# Patient Record
Sex: FEMALE | Race: WHITE | Employment: PART TIME | URBAN - METROPOLITAN AREA
[De-identification: names, ages, dates, MRNs, and addresses within clinical notes are randomized per-mention and may not be internally consistent; named-entity substitution may affect disease eponyms.]

---

## 2017-02-10 ENCOUNTER — TRANSFERRED RECORDS (OUTPATIENT)
Dept: HEALTH INFORMATION MANAGEMENT | Facility: CLINIC | Age: 32
End: 2017-02-10

## 2017-02-10 LAB
HPV ABSTRACT: NORMAL
PAP SMEAR - HIM PATIENT REPORTED: NEGATIVE

## 2017-03-15 ENCOUNTER — TRANSFERRED RECORDS (OUTPATIENT)
Dept: HEALTH INFORMATION MANAGEMENT | Facility: CLINIC | Age: 32
End: 2017-03-15

## 2017-04-04 ENCOUNTER — TRANSFERRED RECORDS (OUTPATIENT)
Dept: HEALTH INFORMATION MANAGEMENT | Facility: CLINIC | Age: 32
End: 2017-04-04

## 2017-08-25 ENCOUNTER — EXTERNAL ORDER RESULTS (OUTPATIENT)
Dept: SURGERY | Facility: CLINIC | Age: 32
End: 2017-08-25

## 2017-08-25 ENCOUNTER — TRANSFERRED RECORDS (OUTPATIENT)
Dept: HEALTH INFORMATION MANAGEMENT | Facility: CLINIC | Age: 32
End: 2017-08-25

## 2017-09-11 ENCOUNTER — OFFICE VISIT (OUTPATIENT)
Dept: FAMILY MEDICINE | Facility: CLINIC | Age: 32
End: 2017-09-11
Payer: COMMERCIAL

## 2017-09-11 VITALS
OXYGEN SATURATION: 99 % | TEMPERATURE: 98.4 F | BODY MASS INDEX: 26.99 KG/M2 | HEIGHT: 65 IN | HEART RATE: 87 BPM | SYSTOLIC BLOOD PRESSURE: 121 MMHG | DIASTOLIC BLOOD PRESSURE: 71 MMHG | WEIGHT: 162 LBS

## 2017-09-11 DIAGNOSIS — Z23 NEED FOR PROPHYLACTIC VACCINATION AND INOCULATION AGAINST INFLUENZA: ICD-10-CM

## 2017-09-11 DIAGNOSIS — Z23 NEED FOR TETANUS BOOSTER: ICD-10-CM

## 2017-09-11 DIAGNOSIS — R22.9 LOCALIZED SUPERFICIAL SWELLING, MASS, OR LUMP: Primary | ICD-10-CM

## 2017-09-11 PROCEDURE — 90686 IIV4 VACC NO PRSV 0.5 ML IM: CPT | Performed by: PHYSICIAN ASSISTANT

## 2017-09-11 PROCEDURE — 90715 TDAP VACCINE 7 YRS/> IM: CPT | Performed by: PHYSICIAN ASSISTANT

## 2017-09-11 PROCEDURE — 90471 IMMUNIZATION ADMIN: CPT | Performed by: PHYSICIAN ASSISTANT

## 2017-09-11 PROCEDURE — 90472 IMMUNIZATION ADMIN EACH ADD: CPT | Performed by: PHYSICIAN ASSISTANT

## 2017-09-11 PROCEDURE — 99203 OFFICE O/P NEW LOW 30 MIN: CPT | Mod: 25 | Performed by: PHYSICIAN ASSISTANT

## 2017-09-11 NOTE — PROGRESS NOTES
Injectable Influenza Immunization Documentation    1.  Are you sick today? (Fever of 100.5 or higher on the day of the clinic)   No    2.  Have you ever had Guillain-Dolores Syndrome within 6 weeks of an influenza vaccionation?  No    3. Do you have a life-threatening allergy to eggs?  No    4. Do you have a life-threatening allergy to a component of the vaccine? May include antibiotics, gelatin or latex.  No     5. Have you ever had a reaction to a dose of flu vaccine that needed immediate medical attention?  No     Form completed by Aydee Marks

## 2017-09-11 NOTE — NURSING NOTE
"Chief Complaint   Patient presents with     Derm Problem       Initial /71 (BP Location: Right arm, Patient Position: Chair, Cuff Size: Adult Regular)  Pulse 87  Temp 98.4  F (36.9  C) (Tympanic)  Ht 5' 5\" (1.651 m)  Wt 162 lb (73.5 kg)  LMP 08/23/2017  SpO2 99%  Breastfeeding? No  BMI 26.96 kg/m2 Estimated body mass index is 26.96 kg/(m^2) as calculated from the following:    Height as of this encounter: 5' 5\" (1.651 m).    Weight as of this encounter: 162 lb (73.5 kg).  Medication Reconciliation: complete   Aydee Marks MA     "

## 2017-09-11 NOTE — PROGRESS NOTES
SUBJECTIVE:   Ivette Rivera is a 31 year old female who presents to clinic today for the following health issues:      Concern - Derm problem   Onset: x 3 weeks     Description:   Bump is on the left lower leg noticed incidentally at first and not sure how. Mild discomfort - with pressure noticed that its squishy - slightly less defined after running. Had an xray done at [facility] to see if anything was growing out of the bone and this was normal.   No pain with running and no limitation.  If doing nothing she seems to notice it more.   No redness or warmth.   No history of trauma.  Saw ortho first as seemed to come on after doing a long run, but it wasn't felt that this was an orthopedic problem.   No other skin lumps.       Intensity: mild    Progression of Symptoms:  same    Accompanying Signs & Symptoms:  Denies growth of the bump - fever- chills - nausea - vomiting -     Previous history of similar problem:   No     Precipitating factors:   Worsened by: Nothing     Alleviating factors:  Improved by: Nothing     Therapies Tried and outcome: Ice - shows no improvement       Problem list and histories reviewed & adjusted, as indicated.  Additional history: as documented    Patient Active Problem List   Diagnosis     Dyspareunia     History reviewed. No pertinent surgical history.    Social History   Substance Use Topics     Smoking status: Never Smoker     Smokeless tobacco: Never Used     Alcohol use Yes      Comment: 2 glass of wine per week      History reviewed. No pertinent family history.      No current outpatient prescriptions on file.     No Known Allergies      Reviewed and updated as needed this visit by clinical staffTobacco  Allergies  Meds  Med Hx  Surg Hx  Fam Hx  Soc Hx      Reviewed and updated as needed this visit by Provider  Tobacco  Allergies  Meds  Med Hx  Surg Hx  Fam Hx  Soc Hx        ROS:  Constitutional, MSK, integumentary systems are negative, except as otherwise  "noted.      OBJECTIVE:   /71 (BP Location: Right arm, Patient Position: Chair, Cuff Size: Adult Regular)  Pulse 87  Temp 98.4  F (36.9  C) (Tympanic)  Ht 5' 5\" (1.651 m)  Wt 162 lb (73.5 kg)  LMP 08/23/2017  SpO2 99%  Breastfeeding? No  BMI 26.96 kg/m2  Body mass index is 26.96 kg/(m^2).  GENERAL: healthy, alert and no distress  SKIN: [id number] superficial cyst-like prominence located along anterior mid shin of L leg. No overlying erythema, warmth or any drainage. No abrasion. No pain or issues with ambulation.     Diagnostic Test Results:  none     ASSESSMENT/PLAN:         ICD-10-CM    1. Localized superficial swelling, mass, or lump R22.9 GENERAL SURG ADULT REFERRAL   2. Need for tetanus booster Z23 TDAP VACCINE (ADACEL)     ADMIN 1st VACCINE   3. Need for prophylactic vaccination and inoculation against influenza Z23 FLU VAC, SPLIT VIRUS IM > 3 YO (QUADRIVALENT) [06257]     Vaccine Administration, Initial [77491]   Watchful waiting had been reviewed with pt previously after her consult with ortho.  Given persistence though she would now like to pursue removal.   Given this is an area of high tension will refer to gen surg.  Risks/benefits of influenza and tetanus vaccines reviewed with pt today as well and she was amenable to updating these.  See Patient Instructions  Patient Instructions   Unclear exact etiology.  Given persistence will refer to general surgery to consult for removal.  Tetanus updated today, let me know if you change your mind about flu vaccine.  Electronically Signed By: Annemarie Mcduffie PA-C          "

## 2017-09-11 NOTE — MR AVS SNAPSHOT
After Visit Summary   9/11/2017    Ivette Rivera    MRN: [id number]           Patient Information     Date Of Birth          1985        Visit Information        Provider Department      9/11/2017 1:40 PM Annemarie Mcduffie PA-C [facility]        Today's Diagnoses     Localized superficial swelling, mass, or lump    -  1    Need for tetanus booster        Encounter for immunization          Care Instructions    Unclear exact etiology.  Given persistence will refer to general surgery to consult for removal.  Tetanus updated today, let me know if you change your mind about flu vaccine.  Electronically Signed By: Annemarie Mcduffie PA-C            Follow-ups after your visit        Additional Services     GENERAL SURG ADULT REFERRAL       Your provider has referred you to: [facility]: [facility] [place] [phone]   [email or web address]    Please be aware that coverage of these services is subject to the terms and limitations of your health insurance plan.  Call member services at your health plan with any benefit or coverage questions.      Please bring the following with you to your appointment:    (1) Any X-Rays, CTs or MRIs which have been performed.  Contact the facility where they were done to arrange for  prior to your scheduled appointment.   (2) List of current medications   (3) This referral request   (4) Any documents/labs given to you for this referral                  Who to contact     If you have questions or need follow up information about today's clinic visit or your schedule please contact [facility] [place] directly at [phone].  Normal or non-critical lab and imaging results will be communicated to you by MyChart, letter or phone within 4 business days after the clinic has received the results. If you do not hear from us within 7 days, please contact the clinic through MyChart or phone.  "If you have a critical or abnormal lab result, we will notify you by phone as soon as possible.  Submit refill requests through NextWidgets or call your pharmacy and they will forward the refill request to us. Please allow 3 business days for your refill to be completed.          Additional Information About Your Visit        Truziphart Information     NextWidgets lets you send messages to your doctor, view your test results, renew your prescriptions, schedule appointments and more. To sign up, go to [email or web address] . Click on \"Log in\" on the left side of the screen, which will take you to the Welcome page. Then click on \"Sign up Now\" on the right side of the page.     You will be asked to enter the access code listed below, as well as some personal information. Please follow the directions to create your username and password.     Your access code is: [email or web address]  Expires: 12/10/2017  2:17 PM     Your access code will  in 90 days. If you need help or a new code, please call your [place] clinic or [phone].        Care EveryWhere ID     This is your Care EveryWhere ID. This could be used by other organizations to access your [facility] medical records  [id number]        Your Vitals Were     Pulse Temperature Height Last Period Pulse Oximetry Breastfeeding?    87 98.4  F (36.9  C) (Tympanic) 5' 5\" (1.651 m) 2017 99% No    BMI (Body Mass Index)                   26.96 kg/m2            Blood Pressure from Last 3 Encounters:   17 121/71    Weight from Last 3 Encounters:   17 162 lb (73.5 kg)              We Performed the Following     ADMIN 1st VACCINE     GENERAL SURG ADULT REFERRAL     TDAP VACCINE (ADACEL)        Primary Care Provider Office Phone # Fax #    Annemarie Mcduffie PA-C [phone] [address]  [place] [address]        Equal Access to Services     PILAR PINEDO AH: Hadii aad ku hadasho Soomaali, waaxda luqadaha, qaybta kaalmada adeegyada, mari scottin " ivonne cornejoadabaljinder cuevastephaniaivana frederick. So [facility] [place] [phone].    ATENCIÓN: Si habla español, tiene a gilliland disposición servicios gratuitos de asistencia lingüística. Ariana al [phone].    We comply with applicable federal civil rights laws and [place] laws. We do not discriminate on the basis of race, color, national origin, age, disability sex, sexual orientation or gender identity.            Thank you!     Thank you for choosing [facility]  for your [facility]. Our goal is always to provide you with excellent care. Hearing back from our patients is one way we can continue to improve our services. Please take a few minutes to complete the written survey that you may receive in the mail after your visit with us. Thank you!             Your Updated Medication List - Protect others around you: Learn how to safely use, store and throw away your medicines at www.disposemymeds.org.      Notice  As of 9/11/2017  2:17 PM    You have not been prescribed any medications.

## 2017-09-16 ENCOUNTER — HEALTH MAINTENANCE LETTER (OUTPATIENT)
Age: 32
End: 2017-09-16

## 2017-09-18 ENCOUNTER — OFFICE VISIT (OUTPATIENT)
Dept: SURGERY | Facility: CLINIC | Age: 32
End: 2017-09-18
Payer: COMMERCIAL

## 2017-09-18 VITALS
SYSTOLIC BLOOD PRESSURE: 102 MMHG | HEIGHT: 65 IN | BODY MASS INDEX: 26.99 KG/M2 | WEIGHT: 162 LBS | DIASTOLIC BLOOD PRESSURE: 60 MMHG

## 2017-09-18 DIAGNOSIS — R22.9 LOCALIZED SUPERFICIAL SWELLING, MASS, OR LUMP: Primary | ICD-10-CM

## 2017-09-18 PROCEDURE — 99203 OFFICE O/P NEW LOW 30 MIN: CPT | Performed by: SURGERY

## 2017-09-18 ASSESSMENT — ENCOUNTER SYMPTOMS
COUGH: 1
TROUBLE SWALLOWING: 1
CLAUDICATION: 1

## 2017-09-18 NOTE — PROGRESS NOTES
HPI      ROS (Review of Systems):      Positive for hoarseness.   HENT: Positive for difficult swallowing.    Respiratory: Positive for cough.    Cardiovascular: Positive for leg pain.          Physical Exam      Assessment:   Ivette Rivera is seen in consultation for left lower leg lump, at the request of Annemarie Mcduffie PA-C.    2.5 cm left lower leg subcutaneous mass.     Plan:  We have discussed the options of observation and excision.  She elects excisional biopsy.  We discussed the indications, alternatives, and risks.  We discussed scarring, numbness, anesthesia, infection, bleeding, and recurrence.    We will schedule surgery with local anesthesia at the patient's convenience.      HPI:  Ivette presents today for a left anterior lower leg subcutaneous mass for the past 4 weeks. She noticed it while shaving her leg.  It seems to be less noticeable after running.  She denies a history of trauma at the site.  She has had no drainage from the site in the past.  She does not have a history of previous infections at this site.  She denies a history of other such masses now or in the past.  She denies a family history of such masses.  It is not painful.  It's size is stable.    Past Medical History:   has no past medical history on file.    Past Surgical History:  Past Surgical History:   Procedure Laterality Date     HERNIA REPAIR          Social History:  Social History     Social History     Marital status:      Spouse name: N/A     Number of children: N/A     Years of education: N/A     Occupational History     Not on file.     Social History Main Topics     Smoking status: Never Smoker     Smokeless tobacco: Never Used     Alcohol use Yes      Comment: 2 glass of wine per week      Drug use: No     Sexual activity: Yes     Other Topics Concern     Not on file     Social History Narrative        Family History:  Family History   Problem Relation Age of Onset     Other Cancer Father       "Coronary Artery Disease Father      DIABETES Maternal Grandmother      Hypertension Maternal Grandmother      Coronary Artery Disease Maternal Grandmother      Hypertension Maternal Grandfather      Colon Cancer Paternal Grandmother      Prostate Cancer Paternal Grandfather      Family history reviewed and not pertinent.    ROS:  The 10 point review of systems is negative other than noted in the HPI and above.    PE:  /60 (BP Location: Right arm, Cuff Size: Adult Regular)  Ht 5' 5\" (1.651 m)  Wt 162 lb (73.5 kg)  LMP 08/23/2017  BMI 26.96 kg/m2  General appearance: well-developed, well-nourished, no apparent distress  HEENT:  Head normocephalic and atraumatic, pupils equal and round, conjunctivae clear, mucous membranes moist, external ears and nose normal.  Normal cervical lymph nodes.  Lungs: respirations unlabored  Musculoskeletal:  Normal station and gait  Extremities: warm, without edema  Neurologic: alert, speech is clear, moves all extremities with good strength  Psychiatric: Mood and affect are appropriate  Skin: There is a firm, mobile 2.5 cm subcutaneous mass on/in the left anterior lower leg.  The overlying skin is normal.  It is non-tender.      IMAGING:  Xray left lower leg.  No abnormality.    This note was created using voice recognition software. Undetected word substitutions or other errors may have occurred.     Time spent with the patient with greater that 50% of the time in discussion was 10 minutes.     Blanca Whitlock MD    Please route or send letter to:  Primary Care Provider (PCP) and Referring Provider      "

## 2017-09-18 NOTE — MR AVS SNAPSHOT
"              After Visit Summary   9/18/2017    Ivette Rivera    MRN: [id number]           Patient Information     Date Of Birth          1985        Visit Information        Provider Department      9/18/2017 1:30 PM Blanca Whitlock MD [facility] General Surgery      Today's Diagnoses     Localized superficial swelling, mass, or lump    -  1       Follow-ups after your visit        Your next 10 appointments already scheduled     Oct 04, 2017  9:00 AM CDT   [facility] [place] Same Day Surgery with Blanca Whitlock MD   [facility] ([facility])    [facility] ([facility])   [phone]              Who to contact     If you have questions or need follow up information about today's clinic visit or your schedule please contact [facility] directly at [phone].  Normal or non-critical lab and imaging results will be communicated to you by [email or web address], letter or phone within 4 business days after the clinic has received the results. If you do not hear from us within 7 days, please contact the clinic through [email or web address] or phone. If you have a critical or abnormal lab result, we will notify you by phone as soon as possible.  Submit refill requests through VISUALPLANT or call your pharmacy and they will forward the refill request to us. Please allow 3 business days for your refill to be completed.          Additional Information About Your Visit        MyChart Information     VISUALPLANT lets you send messages to your doctor, view your test results, renew your prescriptions, schedule appointments and more. To sign up, go to [email or web address] . Click on \"Log in\" on the left side of the screen, which will take you to the Welcome page. Then click on \"Sign up Now\" on the right side of the page.     You will be asked to enter the access code listed below, as " "well as some personal information. Please follow the directions to create your username and password.     Your access code is: [email or web address]  Expires: 12/10/2017  2:17 PM     Your access code will  in 90 days. If you need help or a new code, please call your [facility] clinic or [phone].        Care EveryWhere ID     This is your Care EveryWhere ID. This could be used by other organizations to access your [facility] medical records  [id number]        Your Vitals Were     Height Last Period BMI (Body Mass Index)             5' 5\" (1.651 m) 2017 26.96 kg/m2          Blood Pressure from Last 3 Encounters:   17 102/60   17 121/71    Weight from Last 3 Encounters:   17 162 lb (73.5 kg)   17 162 lb (73.5 kg)              Today, you had the following     No orders found for display       Primary Care Provider Office Phone # Fax #    Annemarie Mcduffie PA-C [phone] [address]  [place] [address]        Equal Access to Services     [facility]: Hadii aad ku hadasho Soomaali, waaxda luqadaha, qaybta kaalmada adeegyada, waxay idiin hayaan adeeg clementearabaljinder lagodfrey . So [facility] [phone].    ATENCIÓN: Si habla español, tiene a gilliland disposición servicios gratuitos de asistencia lingüística. Llame al [phone].    We comply with applicable federal civil rights laws and [place] laws. We do not discriminate on the basis of race, color, national origin, age, disability sex, sexual orientation or gender identity.            Thank you!     Thank you for choosing [facility]  for your care. Our goal is always to provide you with excellent care. Hearing back from our patients is one way we can continue to improve our services. Please take a few minutes to complete the written survey that you may receive in the mail after your visit with us. Thank you!             Your Updated Medication List - Protect others around you: Learn how to safely use, " store and throw away your medicines at www.disposemymeds.org.      Notice  As of 9/18/2017  1:47 PM    You have not been prescribed any medications.

## 2017-09-18 NOTE — LETTER
"      2017     RE:  Ivette Rivera-:  [birth year]     Assessment:   Ivette Rivera is seen in consultation for left lower leg lump, at the request of Annemarie Mcduffie PA-C.     2.5 cm left lower leg subcutaneous mass.      Plan:  We have discussed the options of observation and excision.  She elects excisional biopsy.  We discussed the indications, alternatives, and risks.  We discussed scarring, numbness, anesthesia, infection, bleeding, and recurrence.     We will schedule surgery with local anesthesia at the patient's convenience.        HPI:  Ivette presents today for a left anterior lower leg subcutaneous mass for the past 4 weeks. She noticed it while shaving her leg.  It seems to be less noticeable after running.  She denies a history of trauma at the site.  She has had no drainage from the site in the past.  She does not have a history of previous infections at this site.  She denies a history of other such masses now or in the past.  She denies a family history of such masses.  It is not painful.  It's size is stable.     Past Medical History:  Has no past medical history on file.     Family history reviewed and not pertinent.     ROS:  The 10 point review of systems is negative other than noted in the HPI and above.     PE:  /60 (BP Location: Right arm, Cuff Size: Adult Regular)  Ht 5' 5\" (1.651 m)  Wt 162 lb (73.5 kg)  LMP 2017  BMI 26.96 kg/m2  General appearance: well-developed, well-nourished, no apparent distress  HEENT:  Head normocephalic and atraumatic, pupils equal and round, conjunctivae clear, mucous membranes moist, external ears and nose normal.  Normal cervical lymph nodes.  Lungs: respirations unlabored  Musculoskeletal:  Normal station and gait  Extremities: warm, without edema  Neurologic: alert, speech is clear, moves all extremities with good strength  Psychiatric: Mood and affect are appropriate  Skin: There is a firm, mobile 2.5 cm " subcutaneous mass on/in the left anterior lower leg.  The overlying skin is normal.  It is non-tender.       IMAGING:  Xray left lower leg.  No abnormality.     This note was created using voice recognition software. Undetected word substitutions or other errors may have occurred.         Blanca Whitlock MD

## 2017-09-28 RX ORDER — MULTIVITAMIN,THERAPEUTIC
1 TABLET ORAL DAILY
COMMUNITY

## 2017-09-28 RX ORDER — CETIRIZINE HYDROCHLORIDE 10 MG/1
10 TABLET ORAL DAILY
COMMUNITY

## 2017-10-04 ENCOUNTER — SURGERY (OUTPATIENT)
Age: 32
End: 2017-10-04

## 2017-10-04 ENCOUNTER — HOSPITAL ENCOUNTER (OUTPATIENT)
Facility: CLINIC | Age: 32
Discharge: HOME | End: 2017-10-04
Attending: SURGERY | Admitting: SURGERY
Payer: COMMERCIAL

## 2017-10-04 ENCOUNTER — APPOINTMENT (OUTPATIENT)
Dept: SURGERY | Facility: PHYSICIAN GROUP | Age: 32
End: 2017-10-04
Payer: COMMERCIAL

## 2017-10-04 VITALS
TEMPERATURE: 98.5 F | BODY MASS INDEX: 26.99 KG/M2 | HEART RATE: 63 BPM | RESPIRATION RATE: 16 BRPM | WEIGHT: 162 LBS | SYSTOLIC BLOOD PRESSURE: 107 MMHG | DIASTOLIC BLOOD PRESSURE: 66 MMHG | OXYGEN SATURATION: 98 % | HEIGHT: 65 IN

## 2017-10-04 DIAGNOSIS — R22.9 LOCALIZED SUPERFICIAL SWELLING, MASS, OR LUMP: Primary | ICD-10-CM

## 2017-10-04 PROCEDURE — 40000306 ZZH STATISTIC PRE PROC ASSESS II: Performed by: SURGERY

## 2017-10-04 PROCEDURE — 27618 EXC LEG/ANKLE TUM < 3 CM: CPT | Performed by: SURGERY

## 2017-10-04 PROCEDURE — 27210794 ZZH OR GENERAL SUPPLY STERILE: Performed by: SURGERY

## 2017-10-04 PROCEDURE — S0020 INJECTION, BUPIVICAINE HYDRO: HCPCS | Performed by: SURGERY

## 2017-10-04 PROCEDURE — 36000050 ZZH SURGERY LEVEL 2 1ST 30 MIN: Performed by: SURGERY

## 2017-10-04 PROCEDURE — 88304 TISSUE EXAM BY PATHOLOGIST: CPT | Performed by: SURGERY

## 2017-10-04 PROCEDURE — 25000132 ZZH RX MED GY IP 250 OP 250 PS 637: Performed by: SURGERY

## 2017-10-04 PROCEDURE — 88304 TISSUE EXAM BY PATHOLOGIST: CPT | Mod: 26 | Performed by: SURGERY

## 2017-10-04 PROCEDURE — 71000027 ZZH RECOVERY PHASE 2 EACH 15 MINS: Performed by: SURGERY

## 2017-10-04 PROCEDURE — 25000128 H RX IP 250 OP 636: Performed by: SURGERY

## 2017-10-04 PROCEDURE — 25000125 ZZHC RX 250: Performed by: SURGERY

## 2017-10-04 RX ORDER — SODIUM CHLORIDE, SODIUM LACTATE, POTASSIUM CHLORIDE, CALCIUM CHLORIDE 600; 310; 30; 20 MG/100ML; MG/100ML; MG/100ML; MG/100ML
INJECTION, SOLUTION INTRAVENOUS CONTINUOUS
Status: DISCONTINUED | OUTPATIENT
Start: 2017-10-04 | End: 2017-10-04 | Stop reason: HOSPADM

## 2017-10-04 RX ORDER — ACETAMINOPHEN 325 MG/1
650 TABLET ORAL EVERY 4 HOURS PRN
Qty: 100 TABLET | Refills: 0 | COMMUNITY
Start: 2017-10-04 | End: 2018-12-06

## 2017-10-04 RX ORDER — IBUPROFEN 600 MG/1
600 TABLET, FILM COATED ORAL
Status: COMPLETED | OUTPATIENT
Start: 2017-10-04 | End: 2017-10-04

## 2017-10-04 RX ADMIN — BUPIVACAINE HYDROCHLORIDE 4 ML: 2.5 INJECTION, SOLUTION INFILTRATION; PERINEURAL at 09:06

## 2017-10-04 RX ADMIN — IBUPROFEN 600 MG: 600 TABLET, FILM COATED ORAL at 09:34

## 2017-10-04 NOTE — OP NOTE
General Surgery Operative Note      Pre-operative diagnosis: Left lower leg subcutaneous mass   Post-operative diagnosis: same    Procedure: excision of left lower leg subcutaneous mass (1.5 cm)   Surgeon: Blanca Whitlock MD   Assistant(s): NONE   Anesthesia: Local    Estimated blood loss: 1 cc     Specimens:   ID Type Source Tests Collected by Time Destination   A : left lower leg subcutaneous mass Tissue Leg Lower, Left SURGICAL PATHOLOGY EXAM Blanca Whitlock MD 10/4/2017  9:13 AM           The left lower leg was prepped and draped in standard sterile fashion.  The skin overlying the mass was anesthetized with local anesthetic.  An incision was made with a length of 1.5 cm.  The incision was carried into the subcutaneous tissue and the mass was completely excised from surrounding tissues using a combination of Bovie electrocautery and blunt dissection.  The mass, which was 1.5 cm in diameter and had a pale fibrous-fatty appearance, was then passed off the field as specimen.  Hemostasis was maintained throughout with electrocautery.  The wound was then irrigated with sterile saline and closed in two layers.  The skin was closed with interrupted 3-0 Vicryl subcuticular sutures and Steristrips.  The patient tolerated the procedure well.  Sponge and needle counts were correct at the end of the case.     Blanca Whitlock MD

## 2017-10-04 NOTE — IP AVS SNAPSHOT
[facility] PreOP/PostOP    [address]    [facility] [address]    Phone:  [phone]    Fax:  [phone]                                       After Visit Summary   10/4/2017    Ivette Rivera    MRN: [id number]           After Visit Summary Signature Page     I have received my discharge instructions, and my questions have been answered. I have discussed any challenges I see with this plan with the nurse or doctor.    ..........................................................................................................................................  Patient/Patient Representative Signature      ..........................................................................................................................................  Patient Representative Print Name and Relationship to Patient    ..................................................               ................................................  Date                                            Time    ..........................................................................................................................................  Reviewed by Signature/Title    ...................................................              ..............................................  Date                                                            Time

## 2017-10-04 NOTE — DISCHARGE INSTRUCTIONS
HOME CARE FOLLOWING MINOR SURGERY  KARON Timmons E. Gavin, N. Guttormson, D. Maurer, VANNESA Fontanez    RESULTS:  If a biopsy of tissue was done, you may call for your final pathology report after 1p.m. two working days after surgery.  Your results will also be reviewed with you at your postoperative appointment.    INCISIONAL CARE:    If you have a dressing in place, keep clean and dry for 48 hours; you may replace the gauze if it becomes soiled.    After 48 hours you may remove the dressing and shower.  Do not submerse incision in water for 1 week.    If you have a Dermabond dressing (a type of skin glue), you may shower immediately.    Sutures which are beneath the skin will absorb and do not need to be removed.    Sutures you can see should be removed at your surgeon's office in 10-14 days at the latest.    If present, leave the steri-strips (white paper tapes) in place for 14 days after surgery.    If present, leave Dermabond glue in place until it wears/flakes off.    You may expect a small amount of drainage from your incision.    A lump/ridge under the incision is normal and will gradually resolve.  If it becomes red or very uncomfortable, contact the nurse at your surgeon's office to discuss whether this needs to be evaluated.    ACTIVITY:  Cautiously resume exercise and strenuous activities such as jogging, tennis, aerobics, etc. Also, be careful of stretching activities which affect the area of surgery for two weeks.    DIET:  No restrictions.  Increased fluid intake is recommended. While taking pain medications, increase dietary fiber or add a fiber supplementation like Metamucil or Citrucel to help prevent constipation - a possible side effect of pain medications.    DISCOMFORT:  Local anesthetic placed at surgery should provide relief for 4-8 hours.  Begin taking pain pills before discomfort is severe.  Take the pain medication with some food, when possible, to minimize side  effects.  Intermittent use of ice packs may help during the first 48 hours.  Expect gradual improvement.  You may slowly transition to use of over-the-counter medications for pain relief when you are no longer requiring narcotics for pain control.    RETURN APPOINTMENT:  Schedule a follow-up visit 2-3 weeks post-op.  Office Phone:  [phone]     CONTACT US IF THE FOLLOWING DEVELOPS:   1. A fever that is above 101     2. If there is a large amount of drainage, bleeding, or swelling.   3. Severe pain that is not relieved by your prescription.   4. Drainage that is thick, cloudy, yellow, green or white.   5. Any other questions not answered by  Frequently Asked Questions  sheet.      FREQUENTLY ASKED QUESTIONS:    Q:  How should my incision look?    A:  Normally your incision will appear slightly swollen with light redness directly along the incision itself as it heals.  It may feel like a bump or ridge as the healing/scarring happens, and over time (3-4 months) this bump or ridge feeling should slowly go away.  In general, clear or pink watery drainage can be normal at first as your incision heals, but should decrease over time.    Q:  How do I know if my incision is infected?  A:  Look at your incision for signs of infection, like redness around the incision spreading to surrounding skin, or drainage of cloudy or foul-smelling drainage.  If you feel warm, check your temperature to see if you are running a fever.    **If any of these things occur, please notify the nurse at our office.  We may need you to come into the office for an incision check.      Q:  How do I take care of my incision?  A:  If you have a dressing in place - Starting the day after surgery, replace the dressing 1-2 times a day until there is no further drainage from the incision.  At that time, a dressing is no longer needed.  Try to minimize tape on the skin if irritation is occurring at the tape sites.  If you have significant irritation from  tape on the skin, please call the office to discuss other method of dressing your incision.    Small pieces of tape called  steri-strips  may be present directly overlying your incision; these may be removed 10 days after surgery unless otherwise specified by your surgeon.  If these tapes start to loosen at the ends, you may trim them back until they fall off or are removed.    A:  If you had  Dermabond  tissue glue used as a dressing (this causes your incision to look shiny with a clear covering over it) - This type of dressing wears off with time and does not require more dressings over the top unless it is draining around the glue as it wears off.  Do not apply ointments or lotions over the incisions until the glue has completely worn off.    Q:  There is a piece of tape or a sticky  lead  still on my skin.  Can I remove this?  A:  Sometimes the sticky  leads  used for monitoring during surgery or for evaluation in the emergency department are not all removed while you are in the hospital.  These sometimes have a tab or metal dot on them.  You can easily remove these on your own, like taking off a band-aid.  If there is a gel substance under the  lead , simply wipe/clean it off with a washcloth or paper towel.      Q:  What can I do to minimize constipation (very hard stools, or lack of stools)?  A:  Stay well hydrated.  Increase your dietary fiber intake or take a fiber supplement -with plenty of water.  Walk around frequently.  You may consider an over-the-counter stool-softener.  Your Pharmacist can assist you with choosing one that is stocked at your pharmacy.  Constipation is also one of the most common side effects of pain medication.  If you are using pain medication, be pro-active and try to PREVENT problems with constipation by taking the steps above BEFORE constipation becomes a problem.    Q:  What do I do if I need more pain medications?  A:  Call the office to receive refills.  Be aware that certain  pain meds cannot be called into a pharmacy and actually require a paper prescription.  A change may be made in your pain med as you progress thru your recovery period or if you have side effects to certain meds.    --Pain meds are NOT refilled after 5pm on weekdays, and NOT AT ALL on the weekends, so please look ahead to prevent problems.      Q:  Why am I having a hard time sleeping now that I am at home?  A:  Many medications you receive while you are in the hospital can impact your sleep for a number of days after your surgery/hospitalization.  Decreased level of activity and naps during the day may also make sleeping at night difficult.  Try to minimize day-time naps, and get up frequently during the day to walk around your home during your recovery time.  Sleep aides may be of some help, but are not recommended for long-term use.      Q:  I am having some back discomfort.  What should I do?  A:  This may be related to certain positioning that was required for your surgery, extended periods of time in bed, or other changes in your overall activity level.  You may try ice, heat, acetaminophen, or ibuprofen to treat this temporarily.  Note that many pain medications have acetaminophen in them and would state this on the prescription bottle.  Be sure not to exceed the maximum of 4000mg per day of acetaminophen.     **If the pain you are having does not resolve, is severe, or is a flare of back pain you have had on other occasions prior to surgery, please contact your primary physician for further recommendations or for an appointment to be examined at their office.    Q:  Why am I having headaches?  A:  Headaches can be caused by many things:  caffeine withdrawal, use of pain meds, dehydration, high blood pressure, lack of sleep, over-activity/exhaustion, flare-up of usual migraine headaches.  If you feel this is related to muscle tension (a band-like feeling around the head, or a pressure at the low-back of the  head) you may try ice or heat to this area.  You may need to drink more fluids (try electrolyte drink like Gatorade), rest, or take your usual migraine medications.   **If your headaches do not resolve, worsen, are accompanied by other symptoms, or if your blood pressure is high, please call your primary physician for recommendation and/or examination.    Q:  I am unable to urinate.  What do I do?  A:  A small percentage of people can have difficulty urinating initially after surgery.  This includes being able to urinate only a very small amount at a time and feeling discomfort or pressure in the very low abdomen.  This is called  urinary retention , and is actually an urgent situation.  Proceed to your nearest Emergency department for evaluation (not an Urgent Care Center).  Sometimes the bladder does not work correctly after certain medications you receive during surgery, or related to certain procedures.  You may need to have a catheter placed until your bladder recovers.  When planning to go to an Emergency department, it may help to call the ER to let them know you are coming in for this problem after a surgery.  This may help you get in quicker to be evaluated.  **If you have symptoms of a urinary tract infection, please contact your primary physician for the proper evaluation and treatment.    If you have other questions, please call the office Monday thru Friday between 8am and 5pm to discuss with the nurse or physician assistant.  #[phone]    There is a surgeon ON CALL on weekday evenings and over the weekend in case of urgent need only, and may be contacted at the same number.    If you are having an emergency, call 911 or proceed to your nearest emergency department.          Maximum acetaminophen (Tylenol) dose from all sources should not exceed 4 grams (4000 mg) per day.    Ibuprofen (Motrin) at 930 am.  Do not take any ibuprofen products until 3:30 pm.

## 2017-10-04 NOTE — IP AVS SNAPSHOT
MRN:[id number]                      After Visit Summary   10/4/2017    Ivette Rivera    MRN: [id number]           Thank you!     Thank you for choosing [facility] for your care. Our goal is always to provide you with excellent care. Hearing back from our patients is one way we can continue to improve our services. Please take a few minutes to complete the written survey that you may receive in the mail after you visit. If you would like to speak to someone directly about your visit please contact Patient Relations at [phone]. Thank you!          Patient Information     Date Of Birth          1985        About your hospital stay     You were admitted on:  October 4, 2017 You last received care in the:  [facility] PreOP/PostOP    You were discharged on:  October 4, 2017       Who to Call     For medical emergencies, please call 911.  For non-urgent questions about your medical care, please call your primary care provider or clinic, [phone]  For questions related to your surgery, please call your surgery clinic        Attending Provider     Provider Specialty    Blanca Whitlock MD Surgery       Primary Care Provider Office Phone # Fax #    Annemarie Mcduffie PA-C [phone]      After Care Instructions     Dressing       Keep dressing clean and dry.  Dressing / incisional care as instructed by RN and or Surgeon                  Further instructions from your care team       HOME CARE FOLLOWING MINOR SURGERY  KARON Timmons E. Gavin, N. Guttormson, D. Maurer, R. O Donnell, L. Thomas    RESULTS:  If a biopsy of tissue was done, you may call for your final pathology report after 1p.m. two working days after surgery.  Your results will also be reviewed with you at your postoperative appointment.    INCISIONAL CARE:    If you have a dressing in place, keep clean and dry for 48 hours; you may replace the gauze if it becomes  soiled.    After 48 hours you may remove the dressing and shower.  Do not submerse incision in water for 1 week.    If you have a Dermabond dressing (a type of skin glue), you may shower immediately.    Sutures which are beneath the skin will absorb and do not need to be removed.    Sutures you can see should be removed at your surgeon's office in 10-14 days at the latest.    If present, leave the steri-strips (white paper tapes) in place for 14 days after surgery.    If present, leave Dermabond glue in place until it wears/flakes off.    You may expect a small amount of drainage from your incision.    A lump/ridge under the incision is normal and will gradually resolve.  If it becomes red or very uncomfortable, contact the nurse at your surgeon's office to discuss whether this needs to be evaluated.    ACTIVITY:  Cautiously resume exercise and strenuous activities such as jogging, tennis, aerobics, etc. Also, be careful of stretching activities which affect the area of surgery for two weeks.    DIET:  No restrictions.  Increased fluid intake is recommended. While taking pain medications, increase dietary fiber or add a fiber supplementation like Metamucil or Citrucel to help prevent constipation - a possible side effect of pain medications.    DISCOMFORT:  Local anesthetic placed at surgery should provide relief for 4-8 hours.  Begin taking pain pills before discomfort is severe.  Take the pain medication with some food, when possible, to minimize side effects.  Intermittent use of ice packs may help during the first 48 hours.  Expect gradual improvement.  You may slowly transition to use of over-the-counter medications for pain relief when you are no longer requiring narcotics for pain control.    RETURN APPOINTMENT:  Schedule a follow-up visit 2-3 weeks post-op.  Office Phone:  [phone]     CONTACT US IF THE FOLLOWING DEVELOPS:   1. A fever that is above 101     2. If there is a large amount of drainage,  bleeding, or swelling.   3. Severe pain that is not relieved by your prescription.   4. Drainage that is thick, cloudy, yellow, green or white.   5. Any other questions not answered by  Frequently Asked Questions  sheet.      FREQUENTLY ASKED QUESTIONS:    Q:  How should my incision look?    A:  Normally your incision will appear slightly swollen with light redness directly along the incision itself as it heals.  It may feel like a bump or ridge as the healing/scarring happens, and over time (3-4 months) this bump or ridge feeling should slowly go away.  In general, clear or pink watery drainage can be normal at first as your incision heals, but should decrease over time.    Q:  How do I know if my incision is infected?  A:  Look at your incision for signs of infection, like redness around the incision spreading to surrounding skin, or drainage of cloudy or foul-smelling drainage.  If you feel warm, check your temperature to see if you are running a fever.    **If any of these things occur, please notify the nurse at our office.  We may need you to come into the office for an incision check.      Q:  How do I take care of my incision?  A:  If you have a dressing in place - Starting the day after surgery, replace the dressing 1-2 times a day until there is no further drainage from the incision.  At that time, a dressing is no longer needed.  Try to minimize tape on the skin if irritation is occurring at the tape sites.  If you have significant irritation from tape on the skin, please call the office to discuss other method of dressing your incision.    Small pieces of tape called  steri-strips  may be present directly overlying your incision; these may be removed 10 days after surgery unless otherwise specified by your surgeon.  If these tapes start to loosen at the ends, you may trim them back until they fall off or are removed.    A:  If you had  Dermabond  tissue glue used as a dressing (this causes your incision  to look shiny with a clear covering over it) - This type of dressing wears off with time and does not require more dressings over the top unless it is draining around the glue as it wears off.  Do not apply ointments or lotions over the incisions until the glue has completely worn off.    Q:  There is a piece of tape or a sticky  lead  still on my skin.  Can I remove this?  A:  Sometimes the sticky  leads  used for monitoring during surgery or for evaluation in the emergency department are not all removed while you are in the hospital.  These sometimes have a tab or metal dot on them.  You can easily remove these on your own, like taking off a band-aid.  If there is a gel substance under the  lead , simply wipe/clean it off with a washcloth or paper towel.      Q:  What can I do to minimize constipation (very hard stools, or lack of stools)?  A:  Stay well hydrated.  Increase your dietary fiber intake or take a fiber supplement -with plenty of water.  Walk around frequently.  You may consider an over-the-counter stool-softener.  Your Pharmacist can assist you with choosing one that is stocked at your pharmacy.  Constipation is also one of the most common side effects of pain medication.  If you are using pain medication, be pro-active and try to PREVENT problems with constipation by taking the steps above BEFORE constipation becomes a problem.    Q:  What do I do if I need more pain medications?  A:  Call the office to receive refills.  Be aware that certain pain meds cannot be called into a pharmacy and actually require a paper prescription.  A change may be made in your pain med as you progress thru your recovery period or if you have side effects to certain meds.    --Pain meds are NOT refilled after 5pm on weekdays, and NOT AT ALL on the weekends, so please look ahead to prevent problems.      Q:  Why am I having a hard time sleeping now that I am at home?  A:  Many medications you receive while you are in the  hospital can impact your sleep for a number of days after your surgery/hospitalization.  Decreased level of activity and naps during the day may also make sleeping at night difficult.  Try to minimize day-time naps, and get up frequently during the day to walk around your home during your recovery time.  Sleep aides may be of some help, but are not recommended for long-term use.      Q:  I am having some back discomfort.  What should I do?  A:  This may be related to certain positioning that was required for your surgery, extended periods of time in bed, or other changes in your overall activity level.  You may try ice, heat, acetaminophen, or ibuprofen to treat this temporarily.  Note that many pain medications have acetaminophen in them and would state this on the prescription bottle.  Be sure not to exceed the maximum of 4000mg per day of acetaminophen.     **If the pain you are having does not resolve, is severe, or is a flare of back pain you have had on other occasions prior to surgery, please contact your primary physician for further recommendations or for an appointment to be examined at their office.    Q:  Why am I having headaches?  A:  Headaches can be caused by many things:  caffeine withdrawal, use of pain meds, dehydration, high blood pressure, lack of sleep, over-activity/exhaustion, flare-up of usual migraine headaches.  If you feel this is related to muscle tension (a band-like feeling around the head, or a pressure at the low-back of the head) you may try ice or heat to this area.  You may need to drink more fluids (try electrolyte drink like Gatorade), rest, or take your usual migraine medications.   **If your headaches do not resolve, worsen, are accompanied by other symptoms, or if your blood pressure is high, please call your primary physician for recommendation and/or examination.    Q:  I am unable to urinate.  What do I do?  A:  A small percentage of people can have difficulty urinating  "initially after surgery.  This includes being able to urinate only a very small amount at a time and feeling discomfort or pressure in the very low abdomen.  This is called  urinary retention , and is actually an urgent situation.  Proceed to your nearest Emergency department for evaluation (not an Urgent Care Center).  Sometimes the bladder does not work correctly after certain medications you receive during surgery, or related to certain procedures.  You may need to have a catheter placed until your bladder recovers.  When planning to go to an Emergency department, it may help to call the ER to let them know you are coming in for this problem after a surgery.  This may help you get in quicker to be evaluated.  **If you have symptoms of a urinary tract infection, please contact your primary physician for the proper evaluation and treatment.    If you have other questions, please call the office Monday thru Friday between 8am and 5pm to discuss with the nurse or physician assistant.  #[phone]    There is a surgeon ON CALL on weekday evenings and over the weekend in case of urgent need only, and may be contacted at the same number.    If you are having an emergency, call 911 or proceed to your nearest emergency department.          Maximum acetaminophen (Tylenol) dose from all sources should not exceed 4 grams (4000 mg) per day.    Ibuprofen (Motrin) at 930 am.  Do not take any ibuprofen products until 3:30 pm.            Pending Results     No orders found from 10/2/2017 to 10/5/2017.            Admission Information     Date & Time Provider Department Dept. Phone    10/4/2017 Blanca Whitlock MD [facility] [place] PreOP/PostOP [phone]      Your Vitals Were     Blood Pressure Pulse Temperature Respirations Height Weight    107/66 63 98.5  F (36.9  C) (Temporal) 16 1.651 m (5' 5\") 73.5 kg (162 lb)    Last Period Pulse Oximetry BMI (Body Mass Index)             09/16/2017 98% 26.96 kg/m2       " "  MyChart Information     [email or web address] lets you send messages to your doctor, view your test results, renew your prescriptions, schedule appointments and more. To sign up, go to [email or web address] . Click on \"Log in\" on the left side of the screen, which will take you to the Welcome page. Then click on \"Sign up Now\" on the right side of the page.     You will be asked to enter the access code listed below, as well as some personal information. Please follow the directions to create your username and password.     Your access code is: [email or web address]  Expires: 12/10/2017  2:17 PM     Your access code will  in 90 days. If you need help or a new code, please call your [facility] clinic or [phone].        Care EveryWhere ID     This is your Care EveryWhere ID. This could be used by other organizations to access your [facility] medical records  [id number]        Equal Access to Services     PILAR PINEDO AH: Sara murrayo Sopatty, waalfredoda barbara, qaybta kaalmada adejason, mari fine . So [facility] [phone].    ATENCIÓN: Si dulce de leon, tiene a gilliland disposición servicios gratuitos de asistencia lingüística. Llame al [phone].    We comply with applicable federal civil rights laws and [place] laws. We do not discriminate on the basis of race, color, national origin, age, disability, sex, sexual orientation, or gender identity.               Review of your medicines      START taking        Dose / Directions    acetaminophen 325 MG tablet   Commonly known as:  TYLENOL   Used for:  Localized superficial swelling, mass, or lump        Dose:  650 mg   Take 2 tablets (650 mg) by mouth every 4 hours as needed for other (mild pain)   Quantity:  100 tablet   Refills:  0         CONTINUE these medicines which have NOT CHANGED        Dose / Directions    cetirizine 10 MG tablet   Commonly known as:  zyrTEC        Dose:  10 mg   Take 10 mg by mouth daily   Refills:  0       IBUPROFEN PO "        Dose:  400 mg   Take 400 mg by mouth every 4 hours as needed for moderate pain   Refills:  0       multivitamin, therapeutic Tabs tablet        Dose:  1 tablet   Take 1 tablet by mouth daily   Refills:  0            Where to get your medicines      Some of these will need a paper prescription and others can be bought over the counter. Ask your nurse if you have questions.     You don't need a prescription for these medications     acetaminophen 325 MG tablet                Protect others around you: Learn how to safely use, store and throw away your medicines at www.disposemymeds.org.             Medication List: This is a list of all your medications and when to take them. Check marks below indicate your daily home schedule. Keep this list as a reference.      Medications           Morning Afternoon Evening Bedtime As Needed    acetaminophen 325 MG tablet   Commonly known as:  TYLENOL   Take 2 tablets (650 mg) by mouth every 4 hours as needed for other (mild pain)                                cetirizine 10 MG tablet   Commonly known as:  zyrTEC   Take 10 mg by mouth daily                                IBUPROFEN PO   Take 400 mg by mouth every 4 hours as needed for moderate pain                                multivitamin, therapeutic Tabs tablet   Take 1 tablet by mouth daily

## 2017-10-05 LAB — COPATH REPORT: NORMAL

## 2017-10-05 NOTE — PROGRESS NOTES
Please inform the patient of this benign result.  The changes are consistent with trauma/injury, a bruise of the fatty tissue under the skin.      EGG

## 2017-10-05 NOTE — PROGRESS NOTES
Patient notified that final pathology results have been reviewed and are benign.  Chana Caraballo RN

## 2017-10-06 ENCOUNTER — TELEPHONE (OUTPATIENT)
Dept: SURGERY | Facility: CLINIC | Age: 32
End: 2017-10-06

## 2017-10-06 NOTE — TELEPHONE ENCOUNTER
Discussed care of incision with patient.  Ok to shower.  No tub baths or submerging incision. Ok to leave ROSEMARY if no drainage.

## 2017-10-06 NOTE — TELEPHONE ENCOUNTER
Name of caller: Patient    Reason for Call:  WANTS TO KNOW IF SHE CAN SHOWER    Surgeon:  Dr. Whitlock    Recent Surgery:  Yes.    If yes, when & what type:  EXCISION LEFT LOWER LEG SUBCUTANEOUS MASS 10/4/17    Best phone number to reach pt at is: [phone]   Ok to leave a message with medical info? Yes.    Pharmacy preferred (if calling for a refill): n/a

## 2017-10-16 ENCOUNTER — TELEPHONE (OUTPATIENT)
Dept: SURGERY | Facility: CLINIC | Age: 32
End: 2017-10-16

## 2017-10-16 NOTE — TELEPHONE ENCOUNTER
Name of caller: Patient    Reason for Call:  Leg incision swells up when she goes running    Surgeon:  Dr. Whitlock    Recent Surgery:  Yes.    If yes, when & what type:  10-4-17 EXCISION LEFT LOWER LEG SUBCUTANEOUS MASS      Best phone number to reach pt at is: [phone]  Ok to leave a message with medical info? Yes.    Pharmacy preferred (if calling for a refill): na

## 2017-10-16 NOTE — TELEPHONE ENCOUNTER
"Patient's swelling after running was described as \"a pingpong ball\" - has now resolved.    Plan:  Patient will decrease running, wear compression garments if on feel for a long length of time.  If swelling returns, or patient has any questions or concerns, will call and schedule an appointment.  Chana Caraballo RN    "

## 2017-10-23 ENCOUNTER — OFFICE VISIT (OUTPATIENT)
Dept: SURGERY | Facility: CLINIC | Age: 32
End: 2017-10-23
Payer: COMMERCIAL

## 2017-10-23 VITALS — HEIGHT: 65 IN | WEIGHT: 162 LBS | BODY MASS INDEX: 26.99 KG/M2

## 2017-10-23 DIAGNOSIS — Z09 SURGICAL FOLLOWUP VISIT: Primary | ICD-10-CM

## 2017-10-23 PROCEDURE — 99024 POSTOP FOLLOW-UP VISIT: CPT | Performed by: PHYSICIAN ASSISTANT

## 2017-10-23 NOTE — MR AVS SNAPSHOT
"              After Visit Summary   10/23/2017    Ivette Rivera    MRN: [id number]           Patient Information     Date Of Birth          1985        Visit Information        Provider Department      10/23/2017 11:00 AM Catalina Rolon PA-C [facility] General Surgery      Today's Diagnoses     Surgical followup visit    -  1       Follow-ups after your visit        Your next 10 appointments already scheduled     Oct 23, 2017 11:00 AM CDT   Post Op with Catalina Rolon PA-C   [facility] ([facility])    [address] [facility] [address]  [facility] [id number] [phone]              Who to contact     If you have questions or need follow up information about today's clinic visit or your schedule please contact [facility] directly at [phone].  Normal or non-critical lab and imaging results will be communicated to you by [employer], letter or phone within 4 business days after the clinic has received the results. If you do not hear from us within 7 days, please contact the clinic through [employer] or phone. If you have a critical or abnormal lab result, we will notify you by phone as soon as possible.  Submit refill requests through [employer] or call your pharmacy and they will forward the refill request to us. Please allow 3 business days for your refill to be completed.          Additional Information About Your Visit        MyChart Information     [employer] lets you send messages to your doctor, view your test results, renew your prescriptions, schedule appointments and more. To sign up, go to [email or web address] . Click on \"Log in\" on the left side of the screen, which will take you to the Welcome page. Then click on \"Sign up Now\" on the right side of the page.     You will be asked to enter the access code listed below, as well as some personal information. Please follow " "the directions to create your username and password.     Your access code is: [email or web address]  Expires: 12/10/2017  2:17 PM     Your access code will  in 90 days. If you need help or a new code, please call your [facility] clinic or [phone].        Care EveryWhere ID     This is your Care EveryWhere ID. This could be used by other organizations to access your [facility] medical records  [id number]        Your Vitals Were     Height Last Period BMI (Body Mass Index)             1.651 m (5' 5\") 2017 26.96 kg/m2          Blood Pressure from Last 3 Encounters:   10/04/17 107/66   17 102/60   17 121/71    Weight from Last 3 Encounters:   10/23/17 73.5 kg (162 lb)   10/04/17 73.5 kg (162 lb)   17 73.5 kg (162 lb)              Today, you had the following     No orders found for display       Primary Care Provider Office Phone # Fax #    Annemarie Mcduffie PA-C [phone] [address]  [place] [address]        Equal Access to Services     DAVID PINEDO : Hadii saritha tinsley hadasho Soomaali, waaxda luqadaha, qaybta kaalmada adeegyada, mari mack. So [facility] [phone].    ATENCIÓN: Si habla español, tiene a gilliland disposición servicios gratuitos de asistencia lingüística. Llame al [phone].    We comply with applicable federal civil rights laws and [place] laws. We do not discriminate on the basis of race, color, national origin, age, disability, sex, sexual orientation, or gender identity.            Thank you!     Thank you for choosing [facility]  for your care. Our goal is always to provide you with excellent care. Hearing back from our patients is one way we can continue to improve our services. Please take a few minutes to complete the written survey that you may receive in the mail after your visit with us. Thank you!             Your Updated Medication List - Protect others around you: Learn how to safely use, " store and throw away your medicines at www.disposemymeds.org.          This list is accurate as of: 10/23/17 10:57 AM.  Always use your most recent med list.                   Brand Name Dispense Instructions for use Diagnosis    acetaminophen 325 MG tablet    TYLENOL    100 tablet    Take 2 tablets (650 mg) by mouth every 4 hours as needed for other (mild pain)    Localized superficial swelling, mass, or lump       cetirizine 10 MG tablet    zyrTEC     Take 10 mg by mouth daily        IBUPROFEN PO      Take 400 mg by mouth every 4 hours as needed for moderate pain        multivitamin, therapeutic Tabs tablet      Take 1 tablet by mouth daily

## 2017-10-23 NOTE — PROGRESS NOTES
[facility] Note   10/23/2017      Subjective:  Ivette Rivera is here for her first postoperative visit. She underwent an excision of left lower leg subcutaneous mass (1.5cm) by Dr. Whitlock on 10/04/2017. Today she  tells me she has been feeling well since surgery although since resuming running and work out activities she has noticed an increase in swelling of the area. She currently does not require narcotic pain medications.     Objective:  Inc - Healing well, well approximated, without signs of infection and no drainage  Small seroma palpated, non tender, without erythema or drainage    Assessment:  S/p excision of left lower leg subcutaneous mass (1.5cm) .   The pathology confirms fibrosis, fat necrosis, hematoma, benign.    Plan:  I offered the patient aspiration of the seroma and she prefers to watch it for the time being as it is asymptomatic.   RTC PRN for seroma aspiration      Catalina Rolon PA-C      Please route or send letter to:  Primary Care Provider (PCP)

## 2018-01-25 ENCOUNTER — TELEPHONE (OUTPATIENT)
Dept: FAMILY MEDICINE | Facility: CLINIC | Age: 33
End: 2018-01-25

## 2018-01-25 NOTE — TELEPHONE ENCOUNTER
"1/25/2018      Patient Communication Preferences indicate  Do not contact  and/or communication by \"Phone\" is not preferred. Call not required per Outreach team.          Outreach ,  Michael King    "

## 2018-08-13 ENCOUNTER — OFFICE VISIT (OUTPATIENT)
Dept: FAMILY MEDICINE | Facility: CLINIC | Age: 33
End: 2018-08-13
Payer: COMMERCIAL

## 2018-08-13 VITALS
OXYGEN SATURATION: 100 % | DIASTOLIC BLOOD PRESSURE: 56 MMHG | HEART RATE: 61 BPM | TEMPERATURE: 98.7 F | BODY MASS INDEX: 27.82 KG/M2 | HEIGHT: 65 IN | WEIGHT: 167 LBS | SYSTOLIC BLOOD PRESSURE: 98 MMHG

## 2018-08-13 DIAGNOSIS — R53.83 FATIGUE, UNSPECIFIED TYPE: Primary | ICD-10-CM

## 2018-08-13 LAB
ALBUMIN SERPL-MCNC: 3.7 G/DL (ref 3.4–5)
ALP SERPL-CCNC: 62 U/L (ref 40–150)
ALT SERPL W P-5'-P-CCNC: 22 U/L (ref 0–50)
ANION GAP SERPL CALCULATED.3IONS-SCNC: 6 MMOL/L (ref 3–14)
AST SERPL W P-5'-P-CCNC: 13 U/L (ref 0–45)
BASOPHILS # BLD AUTO: 0 10E9/L (ref 0–0.2)
BASOPHILS NFR BLD AUTO: 0.3 %
BILIRUB SERPL-MCNC: 0.7 MG/DL (ref 0.2–1.3)
BUN SERPL-MCNC: 14 MG/DL (ref 7–30)
CALCIUM SERPL-MCNC: 8.8 MG/DL (ref 8.5–10.1)
CHLORIDE SERPL-SCNC: 106 MMOL/L (ref 94–109)
CO2 SERPL-SCNC: 28 MMOL/L (ref 20–32)
CREAT SERPL-MCNC: 0.69 MG/DL (ref 0.52–1.04)
DIFFERENTIAL METHOD BLD: NORMAL
EOSINOPHIL # BLD AUTO: 0.2 10E9/L (ref 0–0.7)
EOSINOPHIL NFR BLD AUTO: 2.2 %
ERYTHROCYTE [DISTWIDTH] IN BLOOD BY AUTOMATED COUNT: 12.8 % (ref 10–15)
FERRITIN SERPL-MCNC: 56 NG/ML (ref 12–150)
GFR SERPL CREATININE-BSD FRML MDRD: >90 ML/MIN/1.7M2
GLUCOSE SERPL-MCNC: 86 MG/DL (ref 70–99)
HCT VFR BLD AUTO: 41 % (ref 35–47)
HGB BLD-MCNC: 13.5 G/DL (ref 11.7–15.7)
LYMPHOCYTES # BLD AUTO: 2.5 10E9/L (ref 0.8–5.3)
LYMPHOCYTES NFR BLD AUTO: 32.6 %
MCH RBC QN AUTO: 31 PG (ref 26.5–33)
MCHC RBC AUTO-ENTMCNC: 32.9 G/DL (ref 31.5–36.5)
MCV RBC AUTO: 94 FL (ref 78–100)
MONOCYTES # BLD AUTO: 0.7 10E9/L (ref 0–1.3)
MONOCYTES NFR BLD AUTO: 8.5 %
NEUTROPHILS # BLD AUTO: 4.4 10E9/L (ref 1.6–8.3)
NEUTROPHILS NFR BLD AUTO: 56.4 %
PLATELET # BLD AUTO: 170 10E9/L (ref 150–450)
POTASSIUM SERPL-SCNC: 4.2 MMOL/L (ref 3.4–5.3)
PROT SERPL-MCNC: 7.1 G/DL (ref 6.8–8.8)
RBC # BLD AUTO: 4.35 10E12/L (ref 3.8–5.2)
SODIUM SERPL-SCNC: 140 MMOL/L (ref 133–144)
TSH SERPL DL<=0.005 MIU/L-ACNC: 0.97 MU/L (ref 0.4–4)
WBC # BLD AUTO: 7.7 10E9/L (ref 4–11)

## 2018-08-13 PROCEDURE — 82728 ASSAY OF FERRITIN: CPT | Performed by: FAMILY MEDICINE

## 2018-08-13 PROCEDURE — 36415 COLL VENOUS BLD VENIPUNCTURE: CPT | Performed by: FAMILY MEDICINE

## 2018-08-13 PROCEDURE — 82306 VITAMIN D 25 HYDROXY: CPT | Performed by: FAMILY MEDICINE

## 2018-08-13 PROCEDURE — 85025 COMPLETE CBC W/AUTO DIFF WBC: CPT | Performed by: FAMILY MEDICINE

## 2018-08-13 PROCEDURE — 84443 ASSAY THYROID STIM HORMONE: CPT | Performed by: FAMILY MEDICINE

## 2018-08-13 PROCEDURE — 99213 OFFICE O/P EST LOW 20 MIN: CPT | Performed by: FAMILY MEDICINE

## 2018-08-13 PROCEDURE — 80053 COMPREHEN METABOLIC PANEL: CPT | Performed by: FAMILY MEDICINE

## 2018-08-13 ASSESSMENT — ANXIETY QUESTIONNAIRES
GAD7 TOTAL SCORE: 6
2. NOT BEING ABLE TO STOP OR CONTROL WORRYING: NOT AT ALL
IF YOU CHECKED OFF ANY PROBLEMS ON THIS QUESTIONNAIRE, HOW DIFFICULT HAVE THESE PROBLEMS MADE IT FOR YOU TO DO YOUR WORK, TAKE CARE OF THINGS AT HOME, OR GET ALONG WITH OTHER PEOPLE: SOMEWHAT DIFFICULT
3. WORRYING TOO MUCH ABOUT DIFFERENT THINGS: SEVERAL DAYS
6. BECOMING EASILY ANNOYED OR IRRITABLE: SEVERAL DAYS
7. FEELING AFRAID AS IF SOMETHING AWFUL MIGHT HAPPEN: SEVERAL DAYS
1. FEELING NERVOUS, ANXIOUS, OR ON EDGE: SEVERAL DAYS
5. BEING SO RESTLESS THAT IT IS HARD TO SIT STILL: NOT AT ALL

## 2018-08-13 ASSESSMENT — PATIENT HEALTH QUESTIONNAIRE - PHQ9: 5. POOR APPETITE OR OVEREATING: MORE THAN HALF THE DAYS

## 2018-08-13 NOTE — MR AVS SNAPSHOT
"              After Visit Summary   2018    Ivette Rivera    MRN: [id number]           Patient Information     Date Of Birth          1985        Visit Information        Provider Department      2018 11:20 AM Shine Vargas, DO [facility]        Today's Diagnoses     Fatigue, unspecified type    -  1       Follow-ups after your visit        Follow-up notes from your care team     Return in about 1 month (around 2018) for follow up fatigue, lab results..      Who to contact     If you have questions or need follow up information about today's clinic visit or your schedule please contact [facility] [place] directly at [phone].  Normal or non-critical lab and imaging results will be communicated to you by MyChart, letter or phone within 4 business days after the clinic has received the results. If you do not hear from us within 7 days, please contact the clinic through MyChart or phone. If you have a critical or abnormal lab result, we will notify you by phone as soon as possible.  Submit refill requests through [facility] or call your pharmacy and they will forward the refill request to us. Please allow 3 business days for your refill to be completed.          Additional Information About Your Visit        MyChart Information     [facility] lets you send messages to your doctor, view your test results, renew your prescriptions, schedule appointments and more. To sign up, go to [email or web address] . Click on \"Log in\" on the left side of the screen, which will take you to the Welcome page. Then click on \"Sign up Now\" on the right side of the page.     You will be asked to enter the access code listed below, as well as some personal information. Please follow the directions to create your username and password.     Your access code is: [email or web address]  Expires: 2018 11:57 AM     Your access code will  in 90 days. If you need help or a new code, please call your " "[facility] or [phone].        Care EveryWhere ID     This is your Care EveryWhere ID. This could be used by other organizations to access your [facility] medical records  [id number]        Your Vitals Were     Pulse Temperature Height Last Period Pulse Oximetry BMI (Body Mass Index)    61 98.7  F (37.1  C) (Oral) 5' 5\" (1.651 m) 07/25/2018 100% 27.79 kg/m2       Blood Pressure from Last 3 Encounters:   08/13/18 98/56   10/04/17 107/66   09/18/17 102/60    Weight from Last 3 Encounters:   08/13/18 167 lb (75.8 kg)   10/23/17 162 lb (73.5 kg)   10/04/17 162 lb (73.5 kg)              We Performed the Following     CBC with platelets differential     Comprehensive metabolic panel     Ferritin     TSH with free T4 reflex     Vitamin D Deficiency        Primary Care Provider Office Phone # Fax #    Annemarie Mcduffie PA-C [phone] [address]  [place] [address]        Equal Access to Services     [facility]: Hadii aad ku hadasho Soomaali, waaxda luqadaha, qaybta kaalmada adeegyaedwin, mari fine . So [facility] [phone].    ATENCIÓN: Si habla español, tiene a gilliland disposición servicios gratuitos de asistencia lingüística. Ariana al [phone].    We comply with applicable federal civil rights laws and [place] laws. We do not discriminate on the basis of race, color, national origin, age, disability, sex, sexual orientation, or gender identity.            Thank you!     Thank you for choosing [facility]  for your care. Our goal is always to provide you with excellent care. Hearing back from our patients is one way we can continue to improve our services. Please take a few minutes to complete the written survey that you may receive in the mail after your visit with us. Thank you!             Your Updated Medication List - Protect others around you: Learn how to safely use, store and throw away your medicines at www.disposemymeds.org.        "   This list is accurate as of 8/13/18 11:57 AM.  Always use your most recent med list.                   Brand Name Dispense Instructions for use Diagnosis    acetaminophen 325 MG tablet    TYLENOL    100 tablet    Take 2 tablets (650 mg) by mouth every 4 hours as needed for other (mild pain)    Localized superficial swelling, mass, or lump       cetirizine 10 MG tablet    zyrTEC     Take 10 mg by mouth daily        IBUPROFEN PO      Take 400 mg by mouth every 4 hours as needed for moderate pain        multivitamin, therapeutic Tabs tablet      Take 1 tablet by mouth daily

## 2018-08-13 NOTE — LETTER
August 15, 2018    Ivette Rivera  [address] [address] [address]      Dear Ivetet,    Here is a summary of your recent test results:    Liver and gallbladder tests (ALT,AST, Alk phos,bilirubin) are normal.   -Kidney function (GFR) is normal.   -Sodium is normal.   -Potassium is normal.   -Glucose is normal.   -TSH (thyroid stimulating hormone) level is normal which indicates normal thyroid function.   -Normal red blood cell (hgb) levels, normal white blood cell count and normal platelet levels.   -Vitamin D level is normal, 1000 IU daily in diet or supplements is recommended.   -Ferritin (iron) level is normal.     Your test results are enclosed.      Please contact me if you have any questions. [phone]            Thank you very much for trusting [facility] [place].     Healthy regards,  Shine Vargas, DO           Results for orders placed or performed in visit on 08/13/18   Comprehensive metabolic panel   Result Value Ref Range    Sodium 140 133 - 144 mmol/L    Potassium 4.2 3.4 - 5.3 mmol/L    Chloride 106 94 - 109 mmol/L    Carbon Dioxide 28 20 - 32 mmol/L    Anion Gap 6 3 - 14 mmol/L    Glucose 86 70 - 99 mg/dL    Urea Nitrogen 14 7 - 30 mg/dL    Creatinine 0.69 0.52 - 1.04 mg/dL    GFR Estimate >90 >60 mL/min/1.7m2    GFR Estimate If Black >90 >60 mL/min/1.7m2    Calcium 8.8 8.5 - 10.1 mg/dL    Bilirubin Total 0.7 0.2 - 1.3 mg/dL    Albumin 3.7 3.4 - 5.0 g/dL    Protein Total 7.1 6.8 - 8.8 g/dL    Alkaline Phosphatase 62 40 - 150 U/L    ALT 22 0 - 50 U/L    AST 13 0 - 45 U/L   CBC with platelets differential   Result Value Ref Range    WBC 7.7 4.0 - 11.0 10e9/L    RBC Count 4.35 3.8 - 5.2 10e12/L    Hemoglobin 13.5 11.7 - 15.7 g/dL    Hematocrit 41.0 35.0 - 47.0 %    MCV 94 78 - 100 fl    MCH 31.0 26.5 - 33.0 pg    MCHC 32.9 31.5 - 36.5 g/dL    RDW 12.8 10.0 - 15.0 %    Platelet Count 170 150 - 450 10e9/L    Diff Method Automated Method     % Neutrophils  56.4 %    % Lymphocytes 32.6 %    % Monocytes 8.5 %    % Eosinophils 2.2 %    % Basophils 0.3 %    Absolute Neutrophil 4.4 1.6 - 8.3 10e9/L    Absolute Lymphocytes 2.5 0.8 - 5.3 10e9/L    Absolute Monocytes 0.7 0.0 - 1.3 10e9/L    Absolute Eosinophils 0.2 0.0 - 0.7 10e9/L    Absolute Basophils 0.0 0.0 - 0.2 10e9/L   TSH with free T4 reflex   Result Value Ref Range    TSH 0.97 0.40 - 4.00 mU/L   Vitamin D Deficiency   Result Value Ref Range    Vitamin D Deficiency screening 56 20 - 75 ug/L   Ferritin   Result Value Ref Range    Ferritin 56 12 - 150 ng/mL

## 2018-08-13 NOTE — PROGRESS NOTES
SUBJECTIVE:   Ivette Rivera is a 32 year old female who presents to clinic today for the following health issues:      For the past couple years, Ivette has been feeling more tired/fatigued. She states that some days are worse than other but typically by dinner time, she is done and either wants to go to bed or not do anything. She is  and has one daughter. 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 She feels like she sleeps well - about 8-9 hours nightly. Denies any snoring, apparent apneic episodes, orthopnea, PND. She will occasionally wake up during the night but falls right back to sleep.    She is a runner and notes her mileage has increased. However, had an injury to her back in April that caused her to take a few months off.     She denies any fevers, chills, chest pain, palpitations, shortness of breath, or dyspnea. Her appetite is good and thinks she may have gained about 10 lbs in the past year. She does not take any prescribed medications -- takes multivitamin, Zyrtec, and occasionally magnesium.    Ivette does have long term history of GI issues -- primarily bloating and diarrhea. She made changes in her diet (cut out dairy and gluten) and has some improvement. Dates and soy also seem to exacerbate her symptoms. Denies any symptoms of heartburn or upper abdominal pain. Denies any urinary symptoms or blood in stool. She has regular periods that seem moderate to her. She had an ablation done a couple years ago due to irregular menstruation and heavy periods.    Mood- described as happy 50% of the time and calm the other 50%. She states she will have anxiety about everyday type things (ie, are the dishes done, is the bathroom clean, etc...) but is able to push that out of her mind.    PHQ-9 SCORE 8/13/2018   Total Score 8     JOSEPHINE-7 SCORE 8/13/2018   Total Score 6           Problem list and histories reviewed & adjusted, as  "indicated.  Additional history: as documented    Patient Active Problem List   Diagnosis     Dyspareunia     Past Surgical History:   Procedure Laterality Date     EXCISE MASS LOWER EXTREMITY Left 10/4/2017    Procedure: EXCISE MASS LOWER EXTREMITY;  left Lower Leg Subcutaneous Mass Excision ;  Surgeon: Blanca Whitlock MD;  Location: [facility] OR     GYN SURGERY      ablation 2017     HERNIA REPAIR         Social History   Substance Use Topics     Smoking status: Never Smoker     Smokeless tobacco: Never Used     Alcohol use Yes      Comment: 2 glass of wine per week      Family History   Problem Relation Age of Onset     Other Cancer Father      Coronary Artery Disease Father      Diabetes Maternal Grandmother      Hypertension Maternal Grandmother      Coronary Artery Disease Maternal Grandmother      Hypertension Maternal Grandfather      Colon Cancer Paternal Grandmother      Prostate Cancer Paternal Grandfather            Reviewed and updated as needed this visit by clinical staff  Tobacco  Allergies  Meds  Problems       Reviewed and updated as needed this visit by Provider  Allergies  Meds  Problems         ROS:  Constitutional, HEENT, cardiovascular, pulmonary, gi and gu systems are negative, except as otherwise noted.    OBJECTIVE:     BP 98/56 (BP Location: Right arm, Patient Position: Sitting, Cuff Size: Adult Regular)  Pulse 61  Temp 98.7  F (37.1  C) (Oral)  Ht 5' 5\" (1.651 m)  Wt 167 lb (75.8 kg)  LMP 07/25/2018  SpO2 100%  BMI 27.79 kg/m2  Body mass index is 27.79 kg/(m^2).  GENERAL: healthy, alert and no distress  NECK: no adenopathy, no asymmetry, masses, or scars and thyroid normal to palpation  RESP: lungs clear to auscultation - no rales, rhonchi or wheezes  CV: regular rate and rhythm, normal S1 S2, no S3 or S4, no murmur, click or rub, no peripheral edema and peripheral pulses strong  MS: no gross musculoskeletal defects noted, no edema    Diagnostic Test Results:  none "     ASSESSMENT/PLAN:   1. Fatigue, unspecified type: likely multifactorial. Sounds like sleep is not an issue but could consider further evaluation if remainder of work up unrevealing for cause. Will check baseline labs. She does have slightly elevated PHQ-9 and JOSEPHINE-7 scores which could indicate contributing depressive/anxiety symptoms  - Comprehensive metabolic panel  - CBC with platelets differential  - TSH with free T4 reflex  - Vitamin D Deficiency  - Ferritin    Shine Vargas, DO  [facility] [place]

## 2018-08-14 LAB — DEPRECATED CALCIDIOL+CALCIFEROL SERPL-MC: 56 UG/L (ref 20–75)

## 2018-08-14 ASSESSMENT — ANXIETY QUESTIONNAIRES: GAD7 TOTAL SCORE: 6

## 2018-08-14 ASSESSMENT — PATIENT HEALTH QUESTIONNAIRE - PHQ9: SUM OF ALL RESPONSES TO PHQ QUESTIONS 1-9: 8

## 2018-08-21 ENCOUNTER — TELEPHONE (OUTPATIENT)
Dept: FAMILY MEDICINE | Facility: CLINIC | Age: 33
End: 2018-08-21

## 2018-08-21 DIAGNOSIS — R53.83 FATIGUE, UNSPECIFIED TYPE: ICD-10-CM

## 2018-08-21 DIAGNOSIS — R19.7 DIARRHEA, UNSPECIFIED TYPE: Primary | ICD-10-CM

## 2018-08-21 NOTE — TELEPHONE ENCOUNTER
Reason for Call:  Ivette received normal lab results and would like to know what she should do next. Please advise     Best phone number to reach pt at is: [phone]  Ok to leave a detailed message with medical info? yes    Ellie Frazier  Patient Representative

## 2018-08-21 NOTE — TELEPHONE ENCOUNTER
"She saw SW on the 13th and she is still feeling the same. She has felt this way for years.  She was disappointed that labs didn't reveal any issues. We talked about PHQ9 score and ? Need for depression medications. She has never been on meds before and \"doesn't want to just take something to see if it helps\". She said she wants to investigate what is the matter physically first.    I offered appt with SW- f/u note said to return in one month.  She said please ask him if she should see him or refer to specialist. She doesn't have any specialty in mind though.     Josy Jolly RN- Triage [employer]    "

## 2018-08-22 DIAGNOSIS — R19.7 DIARRHEA, UNSPECIFIED TYPE: ICD-10-CM

## 2018-08-22 DIAGNOSIS — R53.83 FATIGUE, UNSPECIFIED TYPE: ICD-10-CM

## 2018-08-22 PROCEDURE — 83516 IMMUNOASSAY NONANTIBODY: CPT | Performed by: FAMILY MEDICINE

## 2018-08-22 PROCEDURE — 83516 IMMUNOASSAY NONANTIBODY: CPT | Mod: 59 | Performed by: FAMILY MEDICINE

## 2018-08-22 PROCEDURE — 36415 COLL VENOUS BLD VENIPUNCTURE: CPT | Performed by: FAMILY MEDICINE

## 2018-08-22 PROCEDURE — 82784 ASSAY IGA/IGD/IGG/IGM EACH: CPT | Performed by: FAMILY MEDICINE

## 2018-08-22 NOTE — TELEPHONE ENCOUNTER
For further work up of her fatigue, I did think of one other test we could check. At her appointment, she noted having bloating and diarrhea with improvement of symptoms when she avoids gluten or dairy. We could check for Celiac disease as malabsorption of nutrients could lead to fatigue. I'll order test and she can come in at her convenience to get blood drawn. We could also have her see an endocrinologist to see if there is any potential for hormonal etiology for her symptoms. I will place a referral as well.    Shine Vargas,   8/22/2018 10:25 AM

## 2018-08-22 NOTE — TELEPHONE ENCOUNTER
Information given to Ivette. Phone number for referral to schedule given and I scheduled her for lab appointment this morning. Radha Russell R.N.

## 2018-08-23 LAB
IGA SERPL-MCNC: 310 MG/DL (ref 70–380)
TTG IGA SER-ACNC: 1 U/ML
TTG IGG SER-ACNC: <1 U/ML

## 2018-10-02 ENCOUNTER — TELEPHONE (OUTPATIENT)
Dept: FAMILY MEDICINE | Facility: CLINIC | Age: 33
End: 2018-10-02

## 2018-10-02 NOTE — TELEPHONE ENCOUNTER
Needs of attention regarding:  -Cervical Cancer Screening    Health Maintenance Topics with due status: Overdue       Topic Date Due    PAP SCREENING Q3 YR (SYSTEM ASSIGNED) 09/19/2004    INFLUENZA VACCINE 09/01/2018       Communication:  See Letter

## 2018-10-02 NOTE — LETTER
[facility]          [address] [place] [address]                                                                                                       [phone]  October 2, 2018    Ivette Rivera  [address] [address] [address]      Dear Ivette,    A review of your record shows that you are due for the following health maintenance exam(s):    -Pap Smear- a screening test done during a pelvic exam to check for abnormal changes in the cells of the cervix. The cervix is the lower part of the uterus that opens into the vagina. Abnormal cells can develop into cancer if not detected and treated. There are no signs or symptoms related to early cervical cancer so a pelvic exam of the female sex organs and a Pap test are needed. Cervical cancer is preventable and curable if abnormal cells are detected and treated early. Pap tests have reduced deaths from cancer of the cervix in the [place] by 70% over the past 50 years.  Please call to arrange this for you or you can also schedule this through [employer] (online medical record access).  Please disregard this reminder if you have already scheduled or have had this exam elsewhere within the last year.  It would be helpful for us to have a copy of your pap smear report in our file so that we can best coordinate your care.                                                                                    In addition, if we see you for your annual health care maintenance exam (complete physical), and it has been over a year since your last exam, then please schedule that as well.    Thank you very much for choosing [facility].   We appreciate the opportunity to serve you and look forward to supporting your healthcare needs in the future.

## 2018-10-17 ENCOUNTER — HOSPITAL ENCOUNTER (EMERGENCY)
Facility: CLINIC | Age: 33
Discharge: HOME | End: 2018-10-17
Attending: PHYSICIAN ASSISTANT | Admitting: PHYSICIAN ASSISTANT
Payer: COMMERCIAL

## 2018-10-17 ENCOUNTER — APPOINTMENT (OUTPATIENT)
Dept: GENERAL RADIOLOGY | Facility: CLINIC | Age: 33
End: 2018-10-17
Attending: PHYSICIAN ASSISTANT
Payer: COMMERCIAL

## 2018-10-17 VITALS
DIASTOLIC BLOOD PRESSURE: 64 MMHG | OXYGEN SATURATION: 100 % | RESPIRATION RATE: 16 BRPM | SYSTOLIC BLOOD PRESSURE: 95 MMHG | TEMPERATURE: 98.8 F

## 2018-10-17 DIAGNOSIS — S20.219A CONTUSION OF CHEST WALL, UNSPECIFIED LATERALITY, INITIAL ENCOUNTER: ICD-10-CM
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
## 2018-10-17 DIAGNOSIS — S16.1XXA STRAIN OF NECK MUSCLE, INITIAL ENCOUNTER: ICD-10-CM

## 2018-10-17 PROCEDURE — 71046 X-RAY EXAM CHEST 2 VIEWS: CPT

## 2018-10-17 PROCEDURE — 25000132 ZZH RX MED GY IP 250 OP 250 PS 637: Performed by: PHYSICIAN ASSISTANT

## 2018-10-17 PROCEDURE — 93005 ELECTROCARDIOGRAM TRACING: CPT

## 2018-10-17 PROCEDURE — 99284 EMERGENCY DEPT VISIT MOD MDM: CPT | Mod: 25

## 2018-10-17 RX ORDER — IBUPROFEN 600 MG/1
600 TABLET, FILM COATED ORAL ONCE
Status: COMPLETED | OUTPATIENT
Start: 2018-10-17 | End: 2018-10-17

## 2018-10-17 RX ORDER — HYDROCODONE BITARTRATE AND ACETAMINOPHEN 5; 325 MG/1; MG/1
1-2 TABLET ORAL EVERY 4 HOURS PRN
Qty: 10 TABLET | Refills: 0 | Status: SHIPPED | OUTPATIENT
Start: 2018-10-17 | End: 2018-12-06

## 2018-10-17 RX ADMIN — IBUPROFEN 600 MG: 600 TABLET, FILM COATED ORAL at 15:31

## 2018-10-17 ASSESSMENT — ENCOUNTER SYMPTOMS
SHORTNESS OF BREATH: 0
HEADACHES: 1
ABDOMINAL PAIN: 0
NAUSEA: 0
BACK PAIN: 1
NECK PAIN: 1
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
## 2018-10-17 NOTE — DISCHARGE INSTRUCTIONS
Discharge Instructions  Neck Strain    You have been seen today for a neck sprain or strain.  Neck strains usually result from an injury to the neck. Car accidents, contact sports, and falls are common causes of neck strain. Sometimes your neck can start to hurt because of increased activity, muscle tension, an abnormal sleeping position, or because of other problems like arthritis in the neck.     Neck pain usually comes from injured muscles and ligaments. Sometimes there is a herniated ( slipped ) disc. We do not usually do MRI scans to look for these right away, since most herniated discs will get better on their own with time. Today, we did not find any evidence that your neck pain was caused by a serious or dangerous condition. However, sometimes symptoms develop over time and cannot be found during an emergency visit, so it is very important that you follow up with your primary provider.    Generally, every Emergency Department visit should have a follow-up clinic visit with either a primary or a specialty clinic/provider. Please follow-up as instructed by your emergency provider today.    Return to the Emergency Department if:    You have increasing pain in your neck.    You develop difficulty swallowing or breathing.    You have numbness, weakness, or trouble moving your arms or legs.    You have severe dizziness and difficulty walking.    You are unable to control your bladder or bowels.    You develop severe headache or ringing in the ears.    What can I do to help myself at home?    If you had an injury, use cold for the first 1-2 days. Cold helps relieve pain and reduce inflammation.  Apply ice packs to the neck or areas of pain every 1-2 hours for 20 minutes at a time. Place a towel or cloth between your skin and the ice pack.    After the first 2 days, using heat can help with neck pain and stiffness. You may use a warm shower or bath, warm towels on the neck, or a heating pad. Do not sleep with a  heating pad, as you can be burned.     Pain medications - You may take a pain medication such as Tylenol  (acetaminophen), Advil  and Motrin  (ibuprofen), or Aleve  (naproxen).    It is usually best to rest the neck for 1-2 days after an injury, then start gentle stretching exercises.     It is helpful to place a small pillow under the nape of your neck to provide proper neutral positioning.     You should stay active and do your usual work as much as you can, unless this involves heavy physical labor. Ask your provider if you need work restrictions.  If you were given a prescription for medicine here today, be sure to read all of the information (including the package insert) that comes with your prescription.  This will include important information about the medicine, its side effects, and any warnings that you need to know about.  The pharmacist who fills the prescription can provide more information and answer questions you may have about the medicine.  If you have questions or concerns that the pharmacist cannot address, please call or return to the Emergency Department.   Remember that you can always come back to the Emergency Department if you are not able to see your regular provider in the amount of time listed above, if you get any new symptoms, or if there is anything that worries you.
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
## 2018-10-17 NOTE — ED AVS SNAPSHOT
[facility] Emergency Department    [address]    [facility] [address]    Phone:  [phone]    Fax:  [phone]                                       Ivette Rivera   MRN: [id number]    Department:  [facility] Emergency Department   Date of Visit:  10/17/2018           After Visit Summary Signature Page     I have received my discharge instructions, and my questions have been answered. I have discussed any challenges I see with this plan with the nurse or doctor.    ..........................................................................................................................................  Patient/Patient Representative Signature      ..........................................................................................................................................  Patient Representative Print Name and Relationship to Patient    ..................................................               ................................................  Date                                   Time    ..........................................................................................................................................  Reviewed by Signature/Title    ...................................................              ..............................................  Date                                               Time          22EPIC Rev 08/18
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
## 2018-10-18 LAB — INTERPRETATION ECG - MUSE: NORMAL

## 2018-12-06 ENCOUNTER — OFFICE VISIT (OUTPATIENT)
Dept: ENDOCRINOLOGY | Facility: CLINIC | Age: 33
End: 2018-12-06
Payer: COMMERCIAL

## 2018-12-06 VITALS
DIASTOLIC BLOOD PRESSURE: 68 MMHG | SYSTOLIC BLOOD PRESSURE: 104 MMHG | WEIGHT: 167.4 LBS | BODY MASS INDEX: 27.89 KG/M2 | HEIGHT: 65 IN | OXYGEN SATURATION: 100 % | HEART RATE: 76 BPM | TEMPERATURE: 98.1 F

## 2018-12-06 DIAGNOSIS — R53.82 CHRONIC FATIGUE: ICD-10-CM

## 2018-12-06 PROCEDURE — 99204 OFFICE O/P NEW MOD 45 MIN: CPT | Performed by: INTERNAL MEDICINE

## 2018-12-06 NOTE — PATIENT INSTRUCTIONS
[facility]   Dr Rizzo, Endocrinology Department      [facility]   [address]  [place] [address]  Appointment [phone]  Fax: [phone]  [place]: Monday and Tuesday         [place] [address] [facility]  [place] [address]  Appointment [phone]  Fax: [phone]  [place]: Wednesday and Thursday            Need morning labs  Need to make lab only appointment.  Follow up based on that.

## 2018-12-06 NOTE — NURSING NOTE
"ENDOCRINOLOGY INTAKE FORM    Patient Name:  Ivette Rivera  :  1985    Is patient Diabetic?   No  Does patient have non-diabetic or other endocrine issues?  Yes: fatigue    Vitals: /68 (BP Location: Right arm, Patient Position: Chair, Cuff Size: Adult Regular)  Pulse 76  Temp 98.1  F (36.7  C) (Oral)  Ht 1.651 m (5' 5\")  Wt 75.9 kg (167 lb 6.4 oz)  SpO2 100%  Breastfeeding? No  BMI 27.86 kg/m2  BMI= Body mass index is 27.86 kg/(m^2).    Flu vaccine:  Yes:   Pneumonia vaccine:  No    Smoking and Alcohol use:  Social History   Substance Use Topics     Smoking status: Never Smoker     Smokeless tobacco: Never Used     Alcohol use Yes      Comment: 2 glass of wine per week        Mamie Michael CMA    "

## 2018-12-06 NOTE — MR AVS SNAPSHOT
After Visit Summary   2018    Ivette Rivera    MRN: [id number]           Patient Information     Date Of Birth          1985        Visit Information        Provider Department      2018 11:00 AM Lilia Rizzo MD [facility]        Today's Diagnoses     Chronic fatigue          Care Instructions    [facility] & [place] locations   Dr Rizzo, Endocrinology Department      [facility]   [address]  [place] [address]  Appointment [phone]  Fax: [phone]  [place]: Monday and Tuesday         [place] [address] [facility]  [place] [address]  Appointment [phone]  Fax: [phone]  [place]: Wednesday and Thursday            Need morning labs  Need to make lab only appointment.  Follow up based on that.          Follow-ups after your visit        Who to contact     If you have questions or need follow up information about today's clinic visit or your schedule please contact [facility] directly at [phone].  Normal or non-critical lab and imaging results will be communicated to you by MyChart, letter or phone within 4 business days after the clinic has received the results. If you do not hear from us within 7 days, please contact the clinic through MyChart or phone. If you have a critical or abnormal lab result, we will notify you by phone as soon as possible.  Submit refill requests through noodls or call your pharmacy and they will forward the refill request to us. Please allow 3 business days for your refill to be completed.          Additional Information About Your Visit        Care EveryWhere ID     This is your Care EveryWhere ID. This could be used by other organizations to access your [facility] medical records  [id number]        Your Vitals Were     Pulse Temperature Height Pulse Oximetry Breastfeeding?  "BMI (Body Mass Index)    76 98.1  F (36.7  C) (Oral) 1.651 m (5' 5\") 100% No 27.86 kg/m2       Blood Pressure from Last 3 Encounters:   12/06/18 104/68   10/17/18 95/64   08/13/18 98/56    Weight from Last 3 Encounters:   12/06/18 75.9 kg (167 lb 6.4 oz)   08/13/18 75.8 kg (167 lb)   10/23/17 73.5 kg (162 lb)              We Performed the Following     Cortisol     Follicle stimulating hormone     Lutropin     T4 free     TSH     Vitamin B12     Vitamin C ([facility])          Today's Medication Changes          These changes are accurate as of 12/6/18 11:34 AM.  If you have any questions, ask your nurse or doctor.               Stop taking these medicines if you haven't already. Please contact your care team if you have questions.     acetaminophen 325 MG tablet   Commonly known as:  TYLENOL   Stopped by:  Lilia Rizzo MD           HYDROcodone-acetaminophen 5-325 MG tablet   Commonly known as:  NORCO   Stopped by:  Lilia Rizzo MD           IBUPROFEN PO   Stopped by:  Lilia Rizzo MD                    Primary Care Provider Fax #    Physician No Ref-Primary [phone]       No address on file        Equal Access to Services     PILAR PINEDO : Sara murrayo Sopatty, waaxda luqadaha, qaybta kaalmada adeegyada, waxay sj fine . So [facility] [phone].    ATENCIÓN: Si habla español, tiene a gilliland disposición servicios gratuitos de asistencia lingüística. Llame al [phone].    We comply with applicable federal civil rights laws and [place] laws. We do not discriminate on the basis of race, color, national origin, age, disability, sex, sexual orientation, or gender identity.            Thank you!     Thank you for choosing [facility]  for your care. Our goal is always to provide you with excellent care. Hearing back from our patients is one way we can continue to improve our services. Please take a few minutes to complete the written survey " that you may receive in the mail after your visit with us. Thank you!             Your Updated Medication List - Protect others around you: Learn how to safely use, store and throw away your medicines at www.disposemymeds.org.          This list is accurate as of 12/6/18 11:34 AM.  Always use your most recent med list.                   Brand Name Dispense Instructions for use Diagnosis    cetirizine 10 MG tablet    zyrTEC     Take 10 mg by mouth daily        multivitamin, therapeutic Tabs tablet      Take 1 tablet by mouth daily

## 2018-12-06 NOTE — PROGRESS NOTES
Name: Ivette Rivera  Seen at the request of  for chronic fatigue.  Chief Complaint   Patient presents with     Establish Care     referred by Dr. Vargas     Fatigue      HPI:  Ivette Rivera is a 33 year old female who presents for the evaluation of  Chronic fatigue.  Feeling tired all the time X more in last 2.5 years  Morning is OK. Feels better with coffee. By afternoon feels tired. After evening completely drained out and has to go to sleep.  If she sleeps late or has late night event- then feels tired all day next day.  Working as part time .  Sleep is OM most of the time. No snoring.  No sleep study done.  Weight: + 10 gained wt in 1 year   One 9 year old child.  Periods- regular now. 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  + on and off diarrhea.  + cold intolerance.    No joint pain.  No dry mouth or eyes.  Eating healthy. More like paleo.  Exercise- everyday- weights.    She denies any tachycardia, palpitations, heat intolerance, tremors.  Extensive workup was done in past which showed negative screening test for celiac disease, normal ferritin, vitamin D levels, TSH, CBC, CMP.    PMH/PSH:  History reviewed. No pertinent past medical history.  Past Surgical History:   Procedure Laterality Date     EXCISE MASS LOWER EXTREMITY Left 10/4/2017    Procedure: EXCISE MASS LOWER EXTREMITY;  left Lower Leg Subcutaneous Mass Excision ;  Surgeon: Blanca Whitlock MD;  Location: [facility] OR     GYN SURGERY      ablation 2017     HERNIA REPAIR       Family Hx:  Family History   Problem Relation Age of Onset     Other Cancer Father      Coronary Artery Disease Father      Diabetes Maternal Grandmother      Hypertension Maternal Grandmother      Coronary Artery Disease Maternal Grandmother      Hypertension Maternal Grandfather      Colon Cancer Paternal Grandmother      Prostate Cancer Paternal Grandfather      Thyroid disease: No         DM2: No           Social Hx:  Social History     Social History  "    Marital status:      Spouse name: N/A     Number of children: N/A     Years of education: N/A     Occupational History     Not on file.     Social History Main Topics     Smoking status: Never Smoker     Smokeless tobacco: Never Used     Alcohol use Yes      Comment: 2 glass of wine per week      Drug use: No     Sexual activity: Yes     Other Topics Concern     Not on file     Social History Narrative          MEDICATIONS:  has a current medication list which includes the following prescription(s): cetirizine and multivitamin, therapeutic.    ROS     ROS: 10 point ROS neg other than the symptoms noted above in the HPI.    Physical Exam   VS: /68 (BP Location: Right arm, Patient Position: Chair, Cuff Size: Adult Regular)  Pulse 76  Temp 98.1  F (36.7  C) (Oral)  Ht 1.651 m (5' 5\")  Wt 75.9 kg (167 lb 6.4 oz)  SpO2 100%  Breastfeeding? No  BMI 27.86 kg/m2  GENERAL: AXOX3, NAD, well dressed, answering questions appropriately, appears stated age.  HEENT: No exopthalmous, no proptosis, EOMI, no lig lag, no retraction  NECK: Thyroid normal in size, non tender, no nodules were palpated.  CV: RRR  LUNGS: CTAB  ABDOMEN: +BS  NEUROLOGY: CN grossly intact, no tremors  PSYCH: normal affect and mood      LABS:  Last Basic Metabolic Panel:  Lab Results   Component Value Date     08/13/2018      Lab Results   Component Value Date    POTASSIUM 4.2 08/13/2018     Lab Results   Component Value Date    CHLORIDE 106 08/13/2018     Lab Results   Component Value Date    HERRERA 8.8 08/13/2018     Lab Results   Component Value Date    CO2 28 08/13/2018     Lab Results   Component Value Date    BUN 14 08/13/2018     Lab Results   Component Value Date    CR 0.69 08/13/2018     Lab Results   Component Value Date    GLC 86 08/13/2018       TSH   Date Value Ref Range Status   08/13/2018 0.97 0.40 - 4.00 mU/L Final   ]      All pertinent notes, labs, and images personally reviewed by me.     A/P  Ms.Kayla FRANCESCO Rivera " is a 33 year old here for the evaluation of:  Chronic fatigue:  I discussed that it can be multifactorial.  Extensive workup has been done in past as noted above including normal screening test for celiac disease, normal ferritin, vitamin D levels, thyroid function test, CBC and CMP.  She does not have major concerning factor for adrenal insufficiency.  Electrolytes are stable.  Weight has been stable.  Recommend labs as noted below  Can consider sleep study as a next day.  She will talk to primary care provider.    Plan: Cortisol, Vitamin B12, TSH, T4 free, Follicle         stimulating hormone, Lutropin, Vitamin C.             More than 50% of face to face time spent with Ms. Rivera on counseling / coordinating her care.      All questions were answered.  The patient indicates understanding of the above issues and agrees with the plan set forth.       Follow-up:  Based on labs    Lilia Rizzo MD  Endocrinology   [facility]/[place]    CC: Dr.Scott Vargas.    Disclaimer: This note consists of symbols derived from keyboarding, dictation and/or voice recognition software. As a result, there may be errors in the script that have gone undetected. Please consider this when interpreting information found in this chart.    Addendum to above note and clinic visit:    Labs reviewed.    See result note/telephone encounter.

## 2018-12-06 NOTE — LETTER
12/6/2018         RE: Ivette Rivera  [address] [address]        Dear Colleague,    Thank you for referring your patient, Ivette Rivera, to the [facility]. Please see a copy of my visit note below.    Name: Ivette Rivera  Seen at the request of  for chronic fatigue.  Chief Complaint   Patient presents with     Establish Care     referred by Dr. Vargas     Fatigue      HPI:  Ivette Rivera is a 33 year old female who presents for the evaluation of  Chronic fatigue.  Feeling tired all the time X more in last 2.5 years  Morning is OK. Feels better with coffee. By afternoon feels tired. After evening completely drained out and has to go to sleep.  If she sleeps late or has late night event- then feels tired all day next day.  Working as part time .  Sleep is OM most of the time. No snoring.  No sleep study done.  Weight: + 10 gained wt in 1 year   One 9 year old child.  Periods- regular now. 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  + on and off diarrhea.  + cold intolerance.    No joint pain.  No dry mouth or eyes.  Eating healthy. More like paleo.  Exercise- everyday- weights.    She denies any tachycardia, palpitations, heat intolerance, tremors.  Extensive workup was done in past which showed negative screening test for celiac disease, normal ferritin, vitamin D levels, TSH, CBC, CMP.    PMH/PSH:  History reviewed. No pertinent past medical history.  Past Surgical History:   Procedure Laterality Date     EXCISE MASS LOWER EXTREMITY Left 10/4/2017    Procedure: EXCISE MASS LOWER EXTREMITY;  left Lower Leg Subcutaneous Mass Excision ;  Surgeon: Blanca Whitlock MD;  Location: [facility] OR     GYN SURGERY      ablation 2017     HERNIA REPAIR       Family Hx:  Family History   Problem Relation Age of Onset     Other Cancer Father      Coronary Artery Disease Father      Diabetes Maternal Grandmother      Hypertension Maternal Grandmother       "Coronary Artery Disease Maternal Grandmother      Hypertension Maternal Grandfather      Colon Cancer Paternal Grandmother      Prostate Cancer Paternal Grandfather      Thyroid disease: No         DM2: No           Social Hx:  Social History     Social History     Marital status:      Spouse name: N/A     Number of children: N/A     Years of education: N/A     Occupational History     Not on file.     Social History Main Topics     Smoking status: Never Smoker     Smokeless tobacco: Never Used     Alcohol use Yes      Comment: 2 glass of wine per week      Drug use: No     Sexual activity: Yes     Other Topics Concern     Not on file     Social History Narrative          MEDICATIONS:  has a current medication list which includes the following prescription(s): cetirizine and multivitamin, therapeutic.    ROS     ROS: 10 point ROS neg other than the symptoms noted above in the HPI.    Physical Exam   VS: /68 (BP Location: Right arm, Patient Position: Chair, Cuff Size: Adult Regular)  Pulse 76  Temp 98.1  F (36.7  C) (Oral)  Ht 1.651 m (5' 5\")  Wt 75.9 kg (167 lb 6.4 oz)  SpO2 100%  Breastfeeding? No  BMI 27.86 kg/m2  GENERAL: AXOX3, NAD, well dressed, answering questions appropriately, appears stated age.  HEENT: No exopthalmous, no proptosis, EOMI, no lig lag, no retraction  NECK: Thyroid normal in size, non tender, no nodules were palpated.  CV: RRR  LUNGS: CTAB  ABDOMEN: +BS  NEUROLOGY: CN grossly intact, no tremors  PSYCH: normal affect and mood      LABS:  Last Basic Metabolic Panel:  Lab Results   Component Value Date     08/13/2018      Lab Results   Component Value Date    POTASSIUM 4.2 08/13/2018     Lab Results   Component Value Date    CHLORIDE 106 08/13/2018     Lab Results   Component Value Date    HERRERA 8.8 08/13/2018     Lab Results   Component Value Date    CO2 28 08/13/2018     Lab Results   Component Value Date    BUN 14 08/13/2018     Lab Results   Component Value Date    CR " 0.69 08/13/2018     Lab Results   Component Value Date    GLC 86 08/13/2018       TSH   Date Value Ref Range Status   08/13/2018 0.97 0.40 - 4.00 mU/L Final   ]      All pertinent notes, labs, and images personally reviewed by me.     A/P  Ms.Kayla FRANCESCO Rivera is a 33 year old here for the evaluation of:  Chronic fatigue:  I discussed that it can be multifactorial.  Extensive workup has been done in past as noted above including normal screening test for celiac disease, normal ferritin, vitamin D levels, thyroid function test, CBC and CMP.  She does not have major concerning factor for adrenal insufficiency.  Electrolytes are stable.  Weight has been stable.  Recommend labs as noted below  Can consider sleep study as a next day.  She will talk to primary care provider.    Plan: Cortisol, Vitamin B12, TSH, T4 free, Follicle         stimulating hormone, Lutropin, Vitamin C.             More than 50% of face to face time spent with Ms. Rivera on counseling / coordinating her care.      All questions were answered.  The patient indicates understanding of the above issues and agrees with the plan set forth.       Follow-up:  Based on labs    Lilia Rizzo MD  Endocrinology   [facility]/[place]    CC: Dr.Scott Vargas.    Disclaimer: This note consists of symbols derived from keyboarding, dictation and/or voice recognition software. As a result, there may be errors in the script that have gone undetected. Please consider this when interpreting information found in this chart.    Addendum to above note and clinic visit:    Labs reviewed.    See result note/telephone encounter.            Again, thank you for allowing me to participate in the care of your patient.        Sincerely,        Lilia Rizzo MD

## 2018-12-10 DIAGNOSIS — R53.82 CHRONIC FATIGUE: ICD-10-CM

## 2018-12-10 LAB
CORTIS SERPL-MCNC: 13.7 UG/DL (ref 4–22)
FSH SERPL-ACNC: 6.7 IU/L
LH SERPL-ACNC: 3.3 IU/L
T4 FREE SERPL-MCNC: 1.12 NG/DL (ref 0.76–1.46)
TSH SERPL DL<=0.005 MIU/L-ACNC: 1.09 MU/L (ref 0.4–4)
VIT B12 SERPL-MCNC: 477 PG/ML (ref 193–986)

## 2018-12-10 PROCEDURE — 82180 ASSAY OF ASCORBIC ACID: CPT | Mod: 90 | Performed by: INTERNAL MEDICINE

## 2018-12-10 PROCEDURE — 82607 VITAMIN B-12: CPT | Performed by: INTERNAL MEDICINE

## 2018-12-10 PROCEDURE — 82533 TOTAL CORTISOL: CPT | Performed by: INTERNAL MEDICINE

## 2018-12-10 PROCEDURE — 83001 ASSAY OF GONADOTROPIN (FSH): CPT | Performed by: INTERNAL MEDICINE

## 2018-12-10 PROCEDURE — 83002 ASSAY OF GONADOTROPIN (LH): CPT | Performed by: INTERNAL MEDICINE

## 2018-12-10 PROCEDURE — 36415 COLL VENOUS BLD VENIPUNCTURE: CPT | Performed by: INTERNAL MEDICINE

## 2018-12-10 PROCEDURE — 84443 ASSAY THYROID STIM HORMONE: CPT | Performed by: INTERNAL MEDICINE

## 2018-12-10 PROCEDURE — 99000 SPECIMEN HANDLING OFFICE-LAB: CPT | Performed by: INTERNAL MEDICINE

## 2018-12-10 PROCEDURE — 84439 ASSAY OF FREE THYROXINE: CPT | Performed by: INTERNAL MEDICINE

## 2018-12-12 LAB — VIT C SERPL-MCNC: 81 UMOL/L (ref 23–114)

## 2019-04-24 ENCOUNTER — TRANSFERRED RECORDS (OUTPATIENT)
Dept: HEALTH INFORMATION MANAGEMENT | Facility: CLINIC | Age: 34
End: 2019-04-24

## 2019-04-24 LAB
CHOLEST SERPL-MCNC: 158 MG/DL (ref 100–199)
HDLC SERPL-MCNC: 71 MG/DL
LDLC SERPL CALC-MCNC: 79 MG/DL (ref 0–99)
TRIGL SERPL-MCNC: 41 MG/DL (ref 0–149)
TSH SERPL-ACNC: 1.01 UIU/ML (ref 65–99)
TSH SERPL-ACNC: 1.46 UIU/ML (ref 0.45–4.5)

## 2019-07-30 ENCOUNTER — HOSPITAL PATHOLOGY (OUTPATIENT)
Dept: OTHER | Facility: CLINIC | Age: 34
End: 2019-07-30

## 2019-07-30 ENCOUNTER — TRANSFERRED RECORDS (OUTPATIENT)
Dept: HEALTH INFORMATION MANAGEMENT | Facility: CLINIC | Age: 34
End: 2019-07-30

## 2019-07-31 LAB — COPATH REPORT: NORMAL

## 2019-09-11 ENCOUNTER — TRANSFERRED RECORDS (OUTPATIENT)
Dept: HEALTH INFORMATION MANAGEMENT | Facility: CLINIC | Age: 34
End: 2019-09-11

## 2019-10-14 NOTE — PROGRESS NOTES
SUBJECTIVE:   CC: Ivette Rivera is an 34 year old woman who presents for preventive health visit.       Healthy Habits:    Do you get at least three servings of calcium containing foods daily (dairy, green leafy vegetables, etc.)? no    Amount of exercise or daily activities, outside of work: 7 day(s) per week    Problems taking medications regularly No    Medication side effects: No    Have you had an eye exam in the past two years? yes    Do you see a dentist twice per year? yes    Do you have sleep apnea, excessive snoring or daytime drowsiness?yes - daytime drowsiness    Sees OBGYN -- Dr. Nayak - signed MAYELA to get records.      Today's PHQ-2 Score:   PHQ-2 ( 1999 Pf[employer]) 10/15/2019 8/13/2018   Q1: Little interest or pleasure in doing things 0 0   Q2: Feeling down, depressed or hopeless 0 0   PHQ-2 Score 0 0       Abuse: Current or Past(Physical, Sexual or Emotional)- No  Do you feel safe in your environment? Yes    Social History     Tobacco Use     Smoking status: Never Smoker     Smokeless tobacco: Never Used   Substance Use Topics     Alcohol use: Yes     Comment: 2 glass of wine per week      If you drink alcohol do you typically have >3 drinks per day or >7 drinks per week? No                     Reviewed orders with patient.  Reviewed health maintenance and updated orders accordingly - Yes  Lab work is in process    Mammogram not appropriate for this patient based on age.    Pertinent mammograms are reviewed under the imaging tab.  History of abnormal Pap smear: NO - age 30-65 PAP every 5 years with negative HPV co-testing recommended     Reviewed and updated as needed this visit by clinical staff  Tobacco  Allergies  Meds  Problems         Reviewed and updated as needed this visit by Provider  Problems        No past medical history on file.   Past Surgical History:   Procedure Laterality Date     EXCISE MASS LOWER EXTREMITY Left 10/4/2017    Procedure: EXCISE MASS LOWER EXTREMITY;  left Lower  "Leg Subcutaneous Mass Excision ;  Surgeon: Blanca Whitlock MD;  Location: [facility] OR     GYN SURGERY      ablation 2017     HERNIA REPAIR         ROS:  CONSTITUTIONAL: NEGATIVE for fever, chills, change in weight  INTEGUMENTARU/SKIN: NEGATIVE for worrisome rashes, moles or lesions  EYES: NEGATIVE for vision changes or irritation  ENT: NEGATIVE for ear, mouth and throat problems  RESP: NEGATIVE for significant cough or SOB  BREAST: NEGATIVE for masses, tenderness or discharge  CV: NEGATIVE for chest pain, palpitations or peripheral edema  GI: NEGATIVE for nausea, abdominal pain, heartburn, or change in bowel habits  : NEGATIVE for unusual urinary or vaginal symptoms. Periods are regular.  MUSCULOSKELETAL: NEGATIVE for significant arthralgias or myalgia  NEURO: NEGATIVE for weakness, dizziness or paresthesias  PSYCHIATRIC: NEGATIVE for changes in mood or affect    OBJECTIVE:   /66   Pulse 81   Temp 97.8  F (36.6  C) (Oral)   Ht 1.651 m (5' 5\")   Wt 72.1 kg (159 lb)   SpO2 100%   BMI 26.46 kg/m    EXAM:  GENERAL: healthy, alert and no distress  EYES: Eyes grossly normal to inspection, PERRL and conjunctivae and sclerae normal  HENT: ear canals and TM's normal, nose and mouth without ulcers or lesions  NECK: no adenopathy and no asymmetry, masses, or scars  RESP: lungs clear to auscultation - no rales, rhonchi or wheezes  CV: regular rate and rhythm, normal S1 S2, no S3 or S4, no murmur, click or rub, no peripheral edema and peripheral pulses strong  ABDOMEN: soft, nontender, no hepatosplenomegaly, no masses and bowel sounds normal  MS: no gross musculoskeletal defects noted, no edema  SKIN: no suspicious lesions or rashes  PSYCH: mentation appears normal, affect normal/bright      ASSESSMENT/PLAN:   1. Routine general medical examination at a health care facility: health maintenance reviewed and updated    2. Screening for diabetes mellitus  - Glucose    3. Screening for hyperlipidemia  - Lipid panel " "reflex to direct LDL Fasting    4. Need for prophylactic vaccination and inoculation against influenza  - INFLUENZA VACCINE IM > 6 MONTHS VALENT IIV4 [40102]  -      ADMIN VACCINE, FIRST [68868]    COUNSELING:   Reviewed preventive health counseling, as reflected in patient instructions       Regular exercise       Healthy diet/nutrition    Estimated body mass index is 26.46 kg/m  as calculated from the following:    Height as of this encounter: 1.651 m (5' 5\").    Weight as of this encounter: 72.1 kg (159 lb).    Weight management plan: Discussed healthy diet and exercise guidelines     reports that she has never smoked. She has never used smokeless tobacco.      Counseling Resources:  ATP IV Guidelines  Pooled Cohorts Equation Calculator  Breast Cancer Risk Calculator  FRAX Risk Assessment  ICSI Preventive Guidelines  Dietary Guidelines for Americans, 2010  USDA's MyPlate  ASA Prophylaxis  Lung CA Screening    Shine Vargas, DO  [facility] [place]  "

## 2019-10-15 ENCOUNTER — OFFICE VISIT (OUTPATIENT)
Dept: FAMILY MEDICINE | Facility: CLINIC | Age: 34
End: 2019-10-15
Payer: COMMERCIAL

## 2019-10-15 VITALS
BODY MASS INDEX: 26.49 KG/M2 | OXYGEN SATURATION: 100 % | HEART RATE: 81 BPM | SYSTOLIC BLOOD PRESSURE: 104 MMHG | DIASTOLIC BLOOD PRESSURE: 66 MMHG | TEMPERATURE: 97.8 F | HEIGHT: 65 IN | WEIGHT: 159 LBS

## 2019-10-15 DIAGNOSIS — Z13.1 SCREENING FOR DIABETES MELLITUS: ICD-10-CM

## 2019-10-15 DIAGNOSIS — Z13.220 SCREENING FOR HYPERLIPIDEMIA: ICD-10-CM

## 2019-10-15 DIAGNOSIS — Z23 NEED FOR PROPHYLACTIC VACCINATION AND INOCULATION AGAINST INFLUENZA: ICD-10-CM

## 2019-10-15 DIAGNOSIS — Z00.00 ROUTINE GENERAL MEDICAL EXAMINATION AT A HEALTH CARE FACILITY: Primary | ICD-10-CM

## 2019-10-15 PROCEDURE — 99395 PREV VISIT EST AGE 18-39: CPT | Mod: 25 | Performed by: FAMILY MEDICINE

## 2019-10-15 PROCEDURE — 90471 IMMUNIZATION ADMIN: CPT | Performed by: FAMILY MEDICINE

## 2019-10-15 PROCEDURE — 80061 LIPID PANEL: CPT | Performed by: FAMILY MEDICINE

## 2019-10-15 PROCEDURE — 36415 COLL VENOUS BLD VENIPUNCTURE: CPT | Performed by: FAMILY MEDICINE

## 2019-10-15 PROCEDURE — 90686 IIV4 VACC NO PRSV 0.5 ML IM: CPT | Performed by: FAMILY MEDICINE

## 2019-10-15 PROCEDURE — 82947 ASSAY GLUCOSE BLOOD QUANT: CPT | Performed by: FAMILY MEDICINE

## 2019-10-15 ASSESSMENT — MIFFLIN-ST. JEOR: SCORE: 1412.1

## 2019-10-15 NOTE — LETTER
October 18, 2019      Ivette MAYA Nicole  [address] [facility] [id number]        Dear ,    We are writing to inform you of your test results.    -Cholesterol levels (LDL,HDL, Triglycerides) are normal. ADVISE: rechecking in 5 years.   -Glucose (diabetic screening test) is normal.     Resulted Orders   Glucose   Result Value Ref Range    Glucose 86 70 - 99 mg/dL      Comment:      Fasting specimen   Lipid panel reflex to direct LDL Fasting   Result Value Ref Range    Cholesterol 139 <200 mg/dL    Triglycerides 33 <150 mg/dL      Comment:      Fasting specimen    HDL Cholesterol 63 >49 mg/dL    LDL Cholesterol Calculated 69 <100 mg/dL      Comment:      Desirable:       <100 mg/dl    Non HDL Cholesterol 76 <130 mg/dL       If you have any questions or concerns, please call the clinic at the number listed above.       Sincerely,        Shine Vargas, DO

## 2019-10-16 LAB
CHOLEST SERPL-MCNC: 139 MG/DL
GLUCOSE SERPL-MCNC: 86 MG/DL (ref 70–99)
HDLC SERPL-MCNC: 63 MG/DL
LDLC SERPL CALC-MCNC: 69 MG/DL
NONHDLC SERPL-MCNC: 76 MG/DL
TRIGL SERPL-MCNC: 33 MG/DL

## 2019-11-18 ENCOUNTER — HOSPITAL ENCOUNTER (OUTPATIENT)
Dept: MAMMOGRAPHY | Facility: CLINIC | Age: 34
Discharge: HOME | End: 2019-11-18
Attending: OBSTETRICS & GYNECOLOGY | Admitting: OBSTETRICS & GYNECOLOGY
Payer: COMMERCIAL

## 2019-11-18 ENCOUNTER — HOSPITAL ENCOUNTER (OUTPATIENT)
Dept: ULTRASOUND IMAGING | Facility: CLINIC | Age: 34
End: 2019-11-18
Attending: OBSTETRICS & GYNECOLOGY
Payer: COMMERCIAL

## 2019-11-18 DIAGNOSIS — N64.52 BREAST DISCHARGE: ICD-10-CM

## 2019-11-18 PROCEDURE — G0279 TOMOSYNTHESIS, MAMMO: HCPCS

## 2019-11-18 PROCEDURE — 76642 ULTRASOUND BREAST LIMITED: CPT | Mod: RT

## 2019-11-18 PROCEDURE — 77066 DX MAMMO INCL CAD BI: CPT

## (undated) DEVICE — BAG CLEAR TRASH 1.3M 39X33" P4040C

## (undated) DEVICE — NDL 22GA 1.5"

## (undated) DEVICE — LINEN HALF SHEET 5512

## (undated) DEVICE — LINEN TOWEL PACK X10 5473

## (undated) DEVICE — ESU GROUND PAD ADULT W/CORD E7507

## (undated) DEVICE — DRAPE LAP PEDS DISP 29492

## (undated) DEVICE — PREP CHLORAPREP 26ML TINTED ORANGE  260815

## (undated) DEVICE — GLOVE PROTEXIS BLUE W/NEU-THERA 7.0  2D73EB70

## (undated) DEVICE — GLOVE PROTEXIS POWDER FREE SMT 6.5  2D72PT65X

## (undated) DEVICE — LINEN FULL SHEET 5511

## (undated) DEVICE — PACK MINOR CUSTOM RIDGES SBA32RMRMA

## (undated) RX ORDER — LIDOCAINE HYDROCHLORIDE AND EPINEPHRINE 10; 10 MG/ML; UG/ML
INJECTION, SOLUTION INFILTRATION; PERINEURAL
Status: DISPENSED
Start: 2017-10-04

## (undated) RX ORDER — BUPIVACAINE HYDROCHLORIDE 2.5 MG/ML
INJECTION, SOLUTION EPIDURAL; INFILTRATION; INTRACAUDAL
Status: DISPENSED
Start: 2017-10-04

## (undated) RX ORDER — IBUPROFEN 600 MG/1
TABLET, FILM COATED ORAL
Status: DISPENSED
Start: 2017-10-04